# Patient Record
Sex: MALE | Race: WHITE | Employment: OTHER | URBAN - METROPOLITAN AREA
[De-identification: names, ages, dates, MRNs, and addresses within clinical notes are randomized per-mention and may not be internally consistent; named-entity substitution may affect disease eponyms.]

---

## 2017-02-02 RX ORDER — COLESEVELAM HYDROCHLORIDE 625 MG/1
TABLET, FILM COATED ORAL
Qty: 90 TABLET | Refills: 5 | Status: SHIPPED | OUTPATIENT
Start: 2017-02-02 | End: 2017-02-08 | Stop reason: SDUPTHER

## 2017-02-08 ENCOUNTER — OFFICE VISIT (OUTPATIENT)
Dept: FAMILY MEDICINE CLINIC | Age: 77
End: 2017-02-08

## 2017-02-08 VITALS
BODY MASS INDEX: 23.85 KG/M2 | OXYGEN SATURATION: 100 % | HEART RATE: 53 BPM | WEIGHT: 161 LBS | SYSTOLIC BLOOD PRESSURE: 116 MMHG | HEIGHT: 69 IN | DIASTOLIC BLOOD PRESSURE: 64 MMHG | TEMPERATURE: 97.5 F

## 2017-02-08 DIAGNOSIS — G25.81 RESTLESS LEGS SYNDROME: ICD-10-CM

## 2017-02-08 DIAGNOSIS — I25.10 ATHEROSCLEROSIS OF NATIVE CORONARY ARTERY OF NATIVE HEART WITHOUT ANGINA PECTORIS: Primary | ICD-10-CM

## 2017-02-08 DIAGNOSIS — E78.00 PURE HYPERCHOLESTEROLEMIA: ICD-10-CM

## 2017-02-08 DIAGNOSIS — E55.9 VITAMIN D DEFICIENCY DISEASE: ICD-10-CM

## 2017-02-08 DIAGNOSIS — F51.04 INSOMNIA, PSYCHOPHYSIOLOGICAL: ICD-10-CM

## 2017-02-08 LAB
CHOLESTEROL, TOTAL: 152 MG/DL (ref 0–199)
HDLC SERPL-MCNC: 58 MG/DL (ref 40–60)
LDL CHOLESTEROL CALCULATED: 81 MG/DL
TRIGL SERPL-MCNC: 64 MG/DL (ref 0–150)
VITAMIN D 25-HYDROXY: 34.7 NG/ML
VLDLC SERPL CALC-MCNC: 13 MG/DL

## 2017-02-08 PROCEDURE — G8427 DOCREV CUR MEDS BY ELIG CLIN: HCPCS | Performed by: FAMILY MEDICINE

## 2017-02-08 PROCEDURE — 1036F TOBACCO NON-USER: CPT | Performed by: FAMILY MEDICINE

## 2017-02-08 PROCEDURE — 4040F PNEUMOC VAC/ADMIN/RCVD: CPT | Performed by: FAMILY MEDICINE

## 2017-02-08 PROCEDURE — 36415 COLL VENOUS BLD VENIPUNCTURE: CPT | Performed by: FAMILY MEDICINE

## 2017-02-08 PROCEDURE — 3017F COLORECTAL CA SCREEN DOC REV: CPT | Performed by: FAMILY MEDICINE

## 2017-02-08 PROCEDURE — G8420 CALC BMI NORM PARAMETERS: HCPCS | Performed by: FAMILY MEDICINE

## 2017-02-08 PROCEDURE — G8484 FLU IMMUNIZE NO ADMIN: HCPCS | Performed by: FAMILY MEDICINE

## 2017-02-08 PROCEDURE — G8598 ASA/ANTIPLAT THER USED: HCPCS | Performed by: FAMILY MEDICINE

## 2017-02-08 PROCEDURE — 1123F ACP DISCUSS/DSCN MKR DOCD: CPT | Performed by: FAMILY MEDICINE

## 2017-02-08 PROCEDURE — 99214 OFFICE O/P EST MOD 30 MIN: CPT | Performed by: FAMILY MEDICINE

## 2017-02-08 RX ORDER — COLESEVELAM 180 1/1
TABLET ORAL
Qty: 90 TABLET | Refills: 5 | Status: SHIPPED | OUTPATIENT
Start: 2017-02-08 | End: 2017-06-14

## 2017-02-08 RX ORDER — ZOLPIDEM TARTRATE 10 MG/1
TABLET ORAL
Qty: 60 TABLET | Refills: 3 | Status: SHIPPED | OUTPATIENT
Start: 2017-02-08 | End: 2017-06-14 | Stop reason: SDUPTHER

## 2017-02-08 RX ORDER — NITROGLYCERIN 0.4 MG/1
0.4 TABLET SUBLINGUAL EVERY 5 MIN PRN
Qty: 25 TABLET | Refills: 0 | Status: ON HOLD | OUTPATIENT
Start: 2017-02-08 | End: 2019-08-03

## 2017-06-14 ENCOUNTER — OFFICE VISIT (OUTPATIENT)
Dept: FAMILY MEDICINE CLINIC | Age: 77
End: 2017-06-14

## 2017-06-14 VITALS
TEMPERATURE: 97.5 F | BODY MASS INDEX: 22.96 KG/M2 | DIASTOLIC BLOOD PRESSURE: 64 MMHG | SYSTOLIC BLOOD PRESSURE: 118 MMHG | HEART RATE: 52 BPM | WEIGHT: 155 LBS | OXYGEN SATURATION: 99 % | HEIGHT: 69 IN

## 2017-06-14 DIAGNOSIS — G25.81 RESTLESS LEGS SYNDROME: ICD-10-CM

## 2017-06-14 DIAGNOSIS — E78.00 PURE HYPERCHOLESTEROLEMIA: ICD-10-CM

## 2017-06-14 DIAGNOSIS — Z80.0 FH: COLON CANCER: ICD-10-CM

## 2017-06-14 DIAGNOSIS — I25.10 ATHEROSCLEROSIS OF NATIVE CORONARY ARTERY OF NATIVE HEART WITHOUT ANGINA PECTORIS: Primary | ICD-10-CM

## 2017-06-14 DIAGNOSIS — R10.30 LOWER ABDOMINAL PAIN: ICD-10-CM

## 2017-06-14 DIAGNOSIS — E83.41 HYPERMAGNESEMIA: ICD-10-CM

## 2017-06-14 DIAGNOSIS — F51.04 INSOMNIA, PSYCHOPHYSIOLOGICAL: ICD-10-CM

## 2017-06-14 DIAGNOSIS — Z87.442 HISTORY OF KIDNEY STONES: ICD-10-CM

## 2017-06-14 LAB
A/G RATIO: 1.8 (ref 1.1–2.2)
ALBUMIN SERPL-MCNC: 4.6 G/DL (ref 3.4–5)
ALP BLD-CCNC: 49 U/L (ref 40–129)
ALT SERPL-CCNC: 19 U/L (ref 10–40)
ANION GAP SERPL CALCULATED.3IONS-SCNC: 13 MMOL/L (ref 3–16)
AST SERPL-CCNC: 22 U/L (ref 15–37)
BASOPHILS ABSOLUTE: 0 K/UL (ref 0–0.2)
BASOPHILS RELATIVE PERCENT: 1.1 %
BILIRUB SERPL-MCNC: 0.8 MG/DL (ref 0–1)
BUN BLDV-MCNC: 15 MG/DL (ref 7–20)
CALCIUM SERPL-MCNC: 9.4 MG/DL (ref 8.3–10.6)
CHLORIDE BLD-SCNC: 103 MMOL/L (ref 99–110)
CHOLESTEROL, TOTAL: 164 MG/DL (ref 0–199)
CO2: 26 MMOL/L (ref 21–32)
CREAT SERPL-MCNC: 1 MG/DL (ref 0.8–1.3)
EOSINOPHILS ABSOLUTE: 0 K/UL (ref 0–0.6)
EOSINOPHILS RELATIVE PERCENT: 1 %
GFR AFRICAN AMERICAN: >60
GFR NON-AFRICAN AMERICAN: >60
GLOBULIN: 2.5 G/DL
GLUCOSE BLD-MCNC: 98 MG/DL (ref 70–99)
HCT VFR BLD CALC: 44.3 % (ref 40.5–52.5)
HDLC SERPL-MCNC: 59 MG/DL (ref 40–60)
HEMOGLOBIN: 14.7 G/DL (ref 13.5–17.5)
LDL CHOLESTEROL CALCULATED: 91 MG/DL
LYMPHOCYTES ABSOLUTE: 1.1 K/UL (ref 1–5.1)
LYMPHOCYTES RELATIVE PERCENT: 25.3 %
MAGNESIUM: 2.3 MG/DL (ref 1.8–2.4)
MCH RBC QN AUTO: 31.9 PG (ref 26–34)
MCHC RBC AUTO-ENTMCNC: 33.1 G/DL (ref 31–36)
MCV RBC AUTO: 96.3 FL (ref 80–100)
MONOCYTES ABSOLUTE: 0.5 K/UL (ref 0–1.3)
MONOCYTES RELATIVE PERCENT: 10.5 %
NEUTROPHILS ABSOLUTE: 2.7 K/UL (ref 1.7–7.7)
NEUTROPHILS RELATIVE PERCENT: 62.1 %
PDW BLD-RTO: 13.7 % (ref 12.4–15.4)
PLATELET # BLD: 207 K/UL (ref 135–450)
PMV BLD AUTO: 8.3 FL (ref 5–10.5)
POTASSIUM SERPL-SCNC: 4.1 MMOL/L (ref 3.5–5.1)
RBC # BLD: 4.6 M/UL (ref 4.2–5.9)
SODIUM BLD-SCNC: 142 MMOL/L (ref 136–145)
TOTAL PROTEIN: 7.1 G/DL (ref 6.4–8.2)
TRIGL SERPL-MCNC: 72 MG/DL (ref 0–150)
VLDLC SERPL CALC-MCNC: 14 MG/DL
WBC # BLD: 4.3 K/UL (ref 4–11)

## 2017-06-14 PROCEDURE — 99214 OFFICE O/P EST MOD 30 MIN: CPT | Performed by: FAMILY MEDICINE

## 2017-06-14 PROCEDURE — 36415 COLL VENOUS BLD VENIPUNCTURE: CPT | Performed by: FAMILY MEDICINE

## 2017-06-14 PROCEDURE — G8427 DOCREV CUR MEDS BY ELIG CLIN: HCPCS | Performed by: FAMILY MEDICINE

## 2017-06-14 PROCEDURE — 4040F PNEUMOC VAC/ADMIN/RCVD: CPT | Performed by: FAMILY MEDICINE

## 2017-06-14 PROCEDURE — 1123F ACP DISCUSS/DSCN MKR DOCD: CPT | Performed by: FAMILY MEDICINE

## 2017-06-14 PROCEDURE — G8598 ASA/ANTIPLAT THER USED: HCPCS | Performed by: FAMILY MEDICINE

## 2017-06-14 PROCEDURE — 1036F TOBACCO NON-USER: CPT | Performed by: FAMILY MEDICINE

## 2017-06-14 PROCEDURE — 3017F COLORECTAL CA SCREEN DOC REV: CPT | Performed by: FAMILY MEDICINE

## 2017-06-14 PROCEDURE — G8420 CALC BMI NORM PARAMETERS: HCPCS | Performed by: FAMILY MEDICINE

## 2017-06-14 RX ORDER — COLESEVELAM 180 1/1
625 TABLET ORAL 2 TIMES DAILY WITH MEALS
Qty: 1 TABLET | Refills: 0
Start: 2017-06-14 | End: 2017-10-17 | Stop reason: SDUPTHER

## 2017-06-14 RX ORDER — DIAZEPAM 10 MG/1
10 TABLET ORAL NIGHTLY PRN
Qty: 30 TABLET | Refills: 0 | Status: SHIPPED | OUTPATIENT
Start: 2017-06-14 | End: 2017-07-12 | Stop reason: SDUPTHER

## 2017-06-14 RX ORDER — ZOLPIDEM TARTRATE 10 MG/1
TABLET ORAL
Qty: 60 TABLET | Refills: 3 | Status: SHIPPED | OUTPATIENT
Start: 2017-06-14 | End: 2017-10-17 | Stop reason: SDUPTHER

## 2017-06-14 ASSESSMENT — PATIENT HEALTH QUESTIONNAIRE - PHQ9
SUM OF ALL RESPONSES TO PHQ QUESTIONS 1-9: 0
1. LITTLE INTEREST OR PLEASURE IN DOING THINGS: 0
2. FEELING DOWN, DEPRESSED OR HOPELESS: 0
SUM OF ALL RESPONSES TO PHQ9 QUESTIONS 1 & 2: 0

## 2017-07-13 RX ORDER — DIAZEPAM 10 MG/1
TABLET ORAL
Qty: 30 TABLET | Refills: 2 | OUTPATIENT
Start: 2017-07-13 | End: 2018-04-17 | Stop reason: SDUPTHER

## 2017-10-17 ENCOUNTER — OFFICE VISIT (OUTPATIENT)
Dept: FAMILY MEDICINE CLINIC | Age: 77
End: 2017-10-17

## 2017-10-17 VITALS
OXYGEN SATURATION: 99 % | WEIGHT: 160 LBS | TEMPERATURE: 97.5 F | DIASTOLIC BLOOD PRESSURE: 62 MMHG | SYSTOLIC BLOOD PRESSURE: 128 MMHG | BODY MASS INDEX: 23.63 KG/M2 | HEART RATE: 52 BPM

## 2017-10-17 DIAGNOSIS — I25.10 ATHEROSCLEROSIS OF NATIVE CORONARY ARTERY OF NATIVE HEART WITHOUT ANGINA PECTORIS: ICD-10-CM

## 2017-10-17 DIAGNOSIS — F51.04 INSOMNIA, PSYCHOPHYSIOLOGICAL: Primary | ICD-10-CM

## 2017-10-17 DIAGNOSIS — R10.84 GENERALIZED ABDOMINAL PAIN: ICD-10-CM

## 2017-10-17 DIAGNOSIS — Z87.442 HISTORY OF KIDNEY STONES: ICD-10-CM

## 2017-10-17 DIAGNOSIS — G25.81 RESTLESS LEGS SYNDROME: ICD-10-CM

## 2017-10-17 PROCEDURE — G8484 FLU IMMUNIZE NO ADMIN: HCPCS | Performed by: FAMILY MEDICINE

## 2017-10-17 PROCEDURE — 99214 OFFICE O/P EST MOD 30 MIN: CPT | Performed by: FAMILY MEDICINE

## 2017-10-17 PROCEDURE — 90688 IIV4 VACCINE SPLT 0.5 ML IM: CPT | Performed by: FAMILY MEDICINE

## 2017-10-17 PROCEDURE — 1036F TOBACCO NON-USER: CPT | Performed by: FAMILY MEDICINE

## 2017-10-17 PROCEDURE — 4040F PNEUMOC VAC/ADMIN/RCVD: CPT | Performed by: FAMILY MEDICINE

## 2017-10-17 PROCEDURE — 1123F ACP DISCUSS/DSCN MKR DOCD: CPT | Performed by: FAMILY MEDICINE

## 2017-10-17 PROCEDURE — G8420 CALC BMI NORM PARAMETERS: HCPCS | Performed by: FAMILY MEDICINE

## 2017-10-17 PROCEDURE — G8427 DOCREV CUR MEDS BY ELIG CLIN: HCPCS | Performed by: FAMILY MEDICINE

## 2017-10-17 PROCEDURE — G0008 ADMIN INFLUENZA VIRUS VAC: HCPCS | Performed by: FAMILY MEDICINE

## 2017-10-17 PROCEDURE — G8598 ASA/ANTIPLAT THER USED: HCPCS | Performed by: FAMILY MEDICINE

## 2017-10-17 PROCEDURE — 3017F COLORECTAL CA SCREEN DOC REV: CPT | Performed by: FAMILY MEDICINE

## 2017-10-17 RX ORDER — COLESEVELAM 180 1/1
1250 TABLET ORAL DAILY
Qty: 60 TABLET | Refills: 5 | Status: SHIPPED | OUTPATIENT
Start: 2017-10-17 | End: 2018-04-13 | Stop reason: SDUPTHER

## 2017-10-17 RX ORDER — ZOLPIDEM TARTRATE 10 MG/1
TABLET ORAL
Qty: 60 TABLET | Refills: 5 | Status: SHIPPED | OUTPATIENT
Start: 2017-10-17 | End: 2018-04-13 | Stop reason: SDUPTHER

## 2017-10-17 NOTE — PROGRESS NOTES
Vaccine Information Sheet, \"Influenza - Inactivated\"  given to Carlos Hopper., or parent/legal guardian of  Carlos Hopper. and verbalized understanding. Patient responses:    Have you ever had a reaction to a flu vaccine? No  Are you able to eat eggs without adverse effects? No  Do you have any current illness? No  Have you ever had Guillian [place] Syndrome? No    Flu vaccine given per order. Please see immunization tab.

## 2017-10-17 NOTE — PROGRESS NOTES
Assessment and plan  1. Insomnia, psychophysiological Stable     2. Atherosclerosis of native coronary artery of native heart without angina pectoris Stable     3. Restless legs syndrome Stable     4. Generalized abdominal pain Resolved, likely due to [place]    5. History of kidney stones  Can cautiously try Theralith one a day. All chronic problems evaluated today are stable or controlled unless noted above. Healthy Family prevention recommendations given. Continue all current prescription medications as listed below. OARRS report for [facility] and Casimiro Barrientos for [place] for the last 12 months was requested and reviewed today. The results of the report was consistent with the current treatment plan. RTC 6 months or sooner prn. Subjective  Patient returns for reevaluation of his medical problems including insomnia, restless legs, coronary artery disease, and his abdominal pain. His insomnia is about the same. He's had no angina or exercise intolerance. His restless leg symptoms have actually been better, and he hasn't had to fill diazepam since mid June. The abdominal pain he was having last visit resolved when he stopped the [place]. However he is concerned about recurrent kidney stones. Social History   Substance Use Topics    Smoking status: Former Smoker     Packs/day: 1.00     Years: 10.00     Types: Cigarettes     Start date: 1/1/1954     Quit date: 1/1/1963    Smokeless tobacco: Never Used      Comment: I haven't used tobacco in any form for over 50 years    Alcohol use 1.2 oz/week     1 Glasses of wine, 1 Cans of beer per week      Comment: I average 6 beers or glasses of wine per week. Past history:  The patient reports he has not had other tests, been to the ER, or visits with a specialist since his last visit with me.     Review of systems:  Constitutional:  fatigue - no                                                  abnormal weight loss - no  Neurologic:  dizziness - no                       syncope - no  Cardiac:  chest pain or discomfort - no                 orthopnea or PND - no  Respiratory: wheezing - no                       dyspnea on exertion - no            unusual cough - no    Objective  /62   Pulse 52   Temp 97.5 °F (36.4 °C) (Oral)   Wt 160 lb (72.6 kg)   SpO2 99%   BMI 23.63 kg/m²   Patient is alert, oriented, and in no acute distress  Psych:  mood and affect are unremarkable               speech and thought processes appear intact               Behavior and appearance unremarkable  Neck:  No masses, trachea midline, phonation normal   Thyroid - unremarkable              Cervical  adenopathy - nothing significant  Chest:  No deformity of thorax               Respirations easy and unlabored              Lungs - clear to auscultation     Breath sounds - equal  Heart:   Rhythm - regular              Murmur - none   Gallop - none               Pretibial pitting edema - none    José Luis Interiano MD    Outpatient Prescriptions Marked as Taking for the 10/17/17 encounter (Office Visit) with José Luis Interiano MD   Medication Sig Dispense Refill    colesevelam (WELCHOL) 625 MG tablet Take 2 tablets by mouth daily 60 tablet 5    zolpidem (AMBIEN) 10 MG tablet TAKE 2 TABLETS AT BEDTIME AS NEEDED FOR SLEEP 60 tablet 5    diazepam (VALIUM) 10 MG tablet TAKE 1 TABLET NIGHTLY AS NEEDED FOR SLEEP 30 tablet 2    nitroGLYCERIN (NITROSTAT) 0.4 MG SL tablet Place 1 tablet under the tongue every 5 minutes as needed for Chest pain 25 tablet 0    ECHINACEA PO Take by mouth      docusate sodium (COLACE) 100 MG capsule Take 100 mg by mouth daily      PSYLLIUM PO Take by mouth      COD LIVER OIL PO Take 2,000 mg by mouth      Ascorbic Acid (VITAMIN C) 500 MG tablet Take 500 mg by mouth daily      Cholecalciferol (VITAMIN D3) 1000 UNITS CAPS Take 4,000 Units by mouth daily (Patient taking differently: Take 2,000 Units by mouth daily ) 1 capsule 0    finasteride (PROSCAR) 5 MG

## 2017-10-31 PROBLEM — E78.2 MIXED HYPERLIPIDEMIA: Status: ACTIVE | Noted: 2017-10-31

## 2017-11-01 ENCOUNTER — OFFICE VISIT (OUTPATIENT)
Dept: CARDIOLOGY CLINIC | Age: 77
End: 2017-11-01

## 2017-11-01 VITALS
BODY MASS INDEX: 23.41 KG/M2 | WEIGHT: 158.08 LBS | HEART RATE: 50 BPM | DIASTOLIC BLOOD PRESSURE: 62 MMHG | SYSTOLIC BLOOD PRESSURE: 116 MMHG | OXYGEN SATURATION: 97 % | HEIGHT: 69 IN

## 2017-11-01 DIAGNOSIS — E78.00 PURE HYPERCHOLESTEROLEMIA: ICD-10-CM

## 2017-11-01 DIAGNOSIS — I25.10 ATHEROSCLEROSIS OF NATIVE CORONARY ARTERY OF NATIVE HEART WITHOUT ANGINA PECTORIS: Primary | ICD-10-CM

## 2017-11-01 PROBLEM — E78.2 MIXED HYPERLIPIDEMIA: Status: RESOLVED | Noted: 2017-10-31 | Resolved: 2017-11-01

## 2017-11-01 PROCEDURE — 1123F ACP DISCUSS/DSCN MKR DOCD: CPT | Performed by: INTERNAL MEDICINE

## 2017-11-01 PROCEDURE — G8420 CALC BMI NORM PARAMETERS: HCPCS | Performed by: INTERNAL MEDICINE

## 2017-11-01 PROCEDURE — 4040F PNEUMOC VAC/ADMIN/RCVD: CPT | Performed by: INTERNAL MEDICINE

## 2017-11-01 PROCEDURE — G8598 ASA/ANTIPLAT THER USED: HCPCS | Performed by: INTERNAL MEDICINE

## 2017-11-01 PROCEDURE — 99214 OFFICE O/P EST MOD 30 MIN: CPT | Performed by: INTERNAL MEDICINE

## 2017-11-01 PROCEDURE — G8427 DOCREV CUR MEDS BY ELIG CLIN: HCPCS | Performed by: INTERNAL MEDICINE

## 2017-11-01 PROCEDURE — 1036F TOBACCO NON-USER: CPT | Performed by: INTERNAL MEDICINE

## 2017-11-01 PROCEDURE — G8484 FLU IMMUNIZE NO ADMIN: HCPCS | Performed by: INTERNAL MEDICINE

## 2017-11-01 PROCEDURE — 3017F COLORECTAL CA SCREEN DOC REV: CPT | Performed by: INTERNAL MEDICINE

## 2017-11-01 NOTE — PROGRESS NOTES
stenosis . Bilateral vertebral arteries are patent with flow in the normal direction. EKG 11/26/14  Sinus bradycardia with marked sinus arrhythmiaNo previous ECGs availableConfirmed by AMY NICHOLSON MD ([id number]) on 11/26/2014   CATH 11/26/14  Findings:     LM: normals  LAD: previous proximal stent patent with 30-40% instent restenosis at distal stent edge. LCX: DOMINANT, luminals. Om1 with mid 20%, distal LCX/PDA with 10-20%  RCA: small nonodominant    LVEDP 7  LVEF 65%    Plan:  1. Patent LAD stent  2. Nonobstructive CAD    Assessment:  Ryne Zabala was seen today for coronary artery disease, hyperlipidemia and check-up. Diagnoses and associated orders for this visit:    CAD (coronary artery disease): sp stent to the LAD in 2010. Hyperlipidemia: intolerant to statin therapy. Managed on Welchol. Last lipids 2/12/16  Cholesterol, [id number]  TG 73 HDL55 LDL Calculated 92  Statin intolerance        Plan:  1. Doing well overall from cardiac standpoint  2. No changes warranted in his medication regimen at this time. 3.  He will follow up with me in 1 year. 4. Healthy lifestyle education reviewed including nutrition, exercise and activity    I reviewed all his labs in epic. QUALITY MEASURES  1. Tobacco Cessation Counseling: NA  2. Retake of BP if >140/90:   NA  3. Documentation to PCP/referring for new patient:  Sent to PCP at close of office visit  4. CAD patient on anti-platelet: Yes  5. CAD patient on STATIN therapy: Not unable to tolerate statin  6.  Patient with CHF and aFib on anticoagulation:  NA       [address] [place], MD 11/1/2017 3:46 PM

## 2017-11-01 NOTE — COMMUNICATION BODY
heart disease  at 82 CHF    Past Medical History:   Diagnosis Date    Chronic fatigue     Kidney stones     Dr Carlos Mariee, takes TheraLith XR supplement    Routine health maintenance     TAC 7/10 ; sees urologist routinely    Vitamin D deficiency disease      Past Surgical History:   Procedure Laterality Date    CORONARY ANGIOPLASTY WITH STENT PLACEMENT  2010    THYROIDECTOMY, PARTIAL  04       Objective:   /62   Pulse 50   Ht 5' 9\" (1.753 m)   Wt 158 lb 1.3 oz (71.7 kg)   SpO2 97%   BMI 23.34 kg/m²      Wt Readings from Last 3 Encounters:   17 158 lb 1.3 oz (71.7 kg)   10/17/17 160 lb (72.6 kg)   17 155 lb (70.3 kg)       Physical Exam:  General: No Respiratory distress, appears well developed and well nourished. Eyes:  Sclera nonicteric  Nose/Sinuses:  negative findings: nose shows no deformity, asymmetry, or inflammation, nasal mucosa normal, septum midline with no perforation or bleeding  Back:  no pain to palpation  Joint:  no active joint inflammation  Musculoskeletal:  negative  Skin:  Warm and dry  Neck:  Negative for JVD and Carotid Bruits. Chest:  Clear to auscultation, respiration easy  Cardiovascular: RRR 52 bpm S2 normal, no murmur, no rub or thrill.   Abdomen:  Soft normal liver and spleen  Extremities:   No edema, No clubbing, cyanosis,  Pulses: Femoral and pedal pulses are normal.  Neuro: intact    Medications:   Outpatient Encounter Prescriptions as of 2017   Medication Sig Dispense Refill    colesevelam (WELCHOL) 625 MG tablet Take 2 tablets by mouth daily 60 tablet 5    zolpidem (AMBIEN) 10 MG tablet TAKE 2 TABLETS AT BEDTIME AS NEEDED FOR SLEEP 60 tablet 5    diazepam (VALIUM) 10 MG tablet TAKE 1 TABLET NIGHTLY AS NEEDED FOR SLEEP 30 tablet 2    nitroGLYCERIN (NITROSTAT) 0.4 MG SL tablet Place 1 tablet under the tongue every 5 minutes as needed for Chest pain 25 tablet 0    ECHINACEA PO Take by mouth      docusate sodium (COLACE) 100 MG capsule Take stenosis . Bilateral vertebral arteries are patent with flow in the normal direction. EKG 11/26/14  Sinus bradycardia with marked sinus arrhythmiaNo previous ECGs availableConfirmed by AMY NICHOLSON MD ([id number]) on 11/26/2014   CATH 11/26/14  Findings:     LM: normals  LAD: previous proximal stent patent with 30-40% instent restenosis at distal stent edge. LCX: DOMINANT, luminals. Om1 with mid 20%, distal LCX/PDA with 10-20%  RCA: small nonodominant    LVEDP 7  LVEF 65%    Plan:  1. Patent LAD stent  2. Nonobstructive CAD    Assessment:  Jovon Silver was seen today for coronary artery disease, hyperlipidemia and check-up. Diagnoses and associated orders for this visit:    CAD (coronary artery disease): sp stent to the LAD in 2010. Hyperlipidemia: intolerant to statin therapy. Managed on Welchol. Last lipids 2/12/16  Cholesterol, [id number]  TG 73 HDL55 LDL Calculated 92  Statin intolerance        Plan:  1. Doing well overall from cardiac standpoint  2. No changes warranted in his medication regimen at this time. 3.  He will follow up with me in 1 year. 4. Healthy lifestyle education reviewed including nutrition, exercise and activity    I reviewed all his labs in epic. QUALITY MEASURES  1. Tobacco Cessation Counseling: NA  2. Retake of BP if >140/90:   NA  3. Documentation to PCP/referring for new patient:  Sent to PCP at close of office visit  4. CAD patient on anti-platelet: Yes  5. CAD patient on STATIN therapy: Not unable to tolerate statin  6.  Patient with CHF and aFib on anticoagulation:  TRENT Sarabia MD 11/1/2017 3:46 PM

## 2017-11-01 NOTE — LETTER
Neuro: intact    Medications:   Outpatient Encounter Prescriptions as of 11/1/2017   Medication Sig Dispense Refill    colesevelam (WELCHOL) 625 MG tablet Take 2 tablets by mouth daily 60 tablet 5    zolpidem (AMBIEN) 10 MG tablet TAKE 2 TABLETS AT BEDTIME AS NEEDED FOR SLEEP 60 tablet 5    diazepam (VALIUM) 10 MG tablet TAKE 1 TABLET NIGHTLY AS NEEDED FOR SLEEP 30 tablet 2    nitroGLYCERIN (NITROSTAT) 0.4 MG SL tablet Place 1 tablet under the tongue every 5 minutes as needed for Chest pain 25 tablet 0    ECHINACEA PO Take by mouth      docusate sodium (COLACE) 100 MG capsule Take 100 mg by mouth daily      PSYLLIUM PO Take by mouth      COD LIVER OIL PO Take 2,000 mg by mouth      Ascorbic Acid (VITAMIN C) 500 MG tablet Take 500 mg by mouth daily Takes 4 tabs daily      Cholecalciferol (VITAMIN D3) 1000 UNITS CAPS Take 4,000 Units by mouth daily (Patient taking differently: Take 2,000 Units by mouth daily ) 1 capsule 0    finasteride (PROSCAR) 5 MG tablet Take 5 mg by mouth every other day      tamsulosin (FLOMAX) 0.4 MG capsule Take 0.8 mg by mouth every other day       aspirin 81 MG tablet 1 tablet daily 30 tablet 6     No facility-administered encounter medications on file as of 11/1/2017. Lab Data:  CBC: No results for input(s): WBC, HGB, HCT, MCV, PLT in the last 72 hours. BMP: No results for input(s): NA, K, CL, CO2, PHOS, BUN, CREATININE in the last 72 hours. Invalid input(s): CA  LIVER PROFILE: No results for input(s): AST, ALT, LIPASE, BILIDIR, BILITOT, ALKPHOS in the last 72 hours. Invalid input(s):   AMYLASE,  ALB  LIPID:   No components found for: CHLPL  Lab Results   Component Value Date    TRIG 72 06/14/2017    TRIG 64 02/08/2017    TRIG 73 02/12/2016     Lab Results   Component Value Date    HDL 59 06/14/2017    HDL 58 02/08/2017    HDL 55 02/12/2016     Lab Results   Component Value Date    LDLCALC 91 06/14/2017    LDLCALC 81 02/08/2017    [facility] 92 02/12/2016 Lab Results   Component Value Date    LABVLDL 14 06/14/2017    LABVLDL 13 02/08/2017    LABVLDL 15 02/12/2016     PT/INR: No results for input(s): PROTIME, INR in the last 72 hours. A1C:   No results found for: LABA1C  BNP:  No results for input(s): BNP in the last 72 hours. Last lipids   6/10/15Cholesterol, Total 166 Triglycerides 92 HDL 59  LDL Calculated 89    8/5/14  Cholesterol, Total 161   Triglycerides 103   HDL 53    LDL Calculated 87    IMAGING:   Carotid US 10/18/16  The right internal carotid artery demonstrates 0-50% stenosis . The left internal carotid artery demonstrates 0-50% stenosis . Bilateral vertebral arteries are patent with flow in the normal direction. EKG 11/26/14  Sinus bradycardia with marked sinus arrhythmiaNo previous ECGs availableConfirmed by AMY NICHOLSON MD ([id number]) on 11/26/2014   CATH 11/26/14  Findings:     LM: normals  LAD: previous proximal stent patent with 30-40% instent restenosis at distal stent edge. LCX: DOMINANT, luminals. Om1 with mid 20%, distal LCX/PDA with 10-20%  RCA: small nonodominant    LVEDP 7  LVEF 65%    Plan:  1. Patent LAD stent  2. Nonobstructive CAD    Assessment:  Anitra Sosa was seen today for coronary artery disease, hyperlipidemia and check-up. Diagnoses and associated orders for this visit:    CAD (coronary artery disease): sp stent to the LAD in 2010. Hyperlipidemia: intolerant to statin therapy. Managed on Welchol. Last lipids 2/12/16  Cholesterol, [id number]  TG 73 HDL55 LDL Calculated 92  Statin intolerance        Plan:  1. Doing well overall from cardiac standpoint  2. No changes warranted in his medication regimen at this time. 3.  He will follow up with me in 1 year. 4. Healthy lifestyle education reviewed including nutrition, exercise and activity    I reviewed all his labs in epic. QUALITY MEASURES  1. Tobacco Cessation Counseling: NA  2.  Retake of BP if >140/90:   NA

## 2017-11-01 NOTE — PATIENT INSTRUCTIONS
Plan:  1. Doing well overall from cardiac standpoint  2. No changes warranted in his medication regimen at this time. 3.  He will follow up with me in 1 year.    4. Healthy lifestyle education reviewed including nutrition, exercise and activity

## 2018-02-16 ENCOUNTER — OFFICE VISIT (OUTPATIENT)
Dept: FAMILY MEDICINE CLINIC | Age: 78
End: 2018-02-16

## 2018-02-16 ENCOUNTER — HOSPITAL ENCOUNTER (OUTPATIENT)
Dept: OTHER | Age: 78
Discharge: HOME | End: 2018-02-16
Attending: FAMILY MEDICINE | Admitting: FAMILY MEDICINE

## 2018-02-16 VITALS
DIASTOLIC BLOOD PRESSURE: 68 MMHG | SYSTOLIC BLOOD PRESSURE: 116 MMHG | HEART RATE: 51 BPM | OXYGEN SATURATION: 98 % | BODY MASS INDEX: 23.63 KG/M2 | WEIGHT: 160 LBS | TEMPERATURE: 97.6 F

## 2018-02-16 DIAGNOSIS — R06.03 ACUTE RESPIRATORY DISTRESS: Primary | ICD-10-CM

## 2018-02-16 DIAGNOSIS — R06.03 ACUTE RESPIRATORY DISTRESS: ICD-10-CM

## 2018-02-16 PROCEDURE — G8598 ASA/ANTIPLAT THER USED: HCPCS | Performed by: FAMILY MEDICINE

## 2018-02-16 PROCEDURE — G8420 CALC BMI NORM PARAMETERS: HCPCS | Performed by: FAMILY MEDICINE

## 2018-02-16 PROCEDURE — 3017F COLORECTAL CA SCREEN DOC REV: CPT | Performed by: FAMILY MEDICINE

## 2018-02-16 PROCEDURE — 1036F TOBACCO NON-USER: CPT | Performed by: FAMILY MEDICINE

## 2018-02-16 PROCEDURE — G8484 FLU IMMUNIZE NO ADMIN: HCPCS | Performed by: FAMILY MEDICINE

## 2018-02-16 PROCEDURE — 99213 OFFICE O/P EST LOW 20 MIN: CPT | Performed by: FAMILY MEDICINE

## 2018-02-16 PROCEDURE — 1123F ACP DISCUSS/DSCN MKR DOCD: CPT | Performed by: FAMILY MEDICINE

## 2018-02-16 PROCEDURE — G8427 DOCREV CUR MEDS BY ELIG CLIN: HCPCS | Performed by: FAMILY MEDICINE

## 2018-02-16 PROCEDURE — 4040F PNEUMOC VAC/ADMIN/RCVD: CPT | Performed by: FAMILY MEDICINE

## 2018-02-16 RX ORDER — INHALER, ASSIST DEVICES
SPACER (EA) MISCELLANEOUS
Qty: 1 DEVICE | Refills: 1 | Status: SHIPPED | OUTPATIENT
Start: 2018-02-16

## 2018-02-16 RX ORDER — ALBUTEROL SULFATE 90 UG/1
1-2 AEROSOL, METERED RESPIRATORY (INHALATION) EVERY 4 HOURS PRN
Qty: 1 INHALER | Refills: 5 | Status: SHIPPED | OUTPATIENT
Start: 2018-02-16 | End: 2019-02-03

## 2018-02-16 NOTE — PATIENT INSTRUCTIONS
Please compare this printed medication list with your medications at home to be sure they are the same. If you have any medications that are different please contact us immediately at [phone]. Also review your allergies that we have listed, these may also include medications that you have not been able to tolerate, make sure everything listed is correct. If you have any allergies that are different please contact us immediately at [phone].

## 2018-02-16 NOTE — PROGRESS NOTES
Assessment and plan  1. Acute respiratory distress Paroxysmal -  Suspect bronchospasm versus aspiration  albuterol sulfate HFA (PROVENTIL HFA) 108 (90 Base) MCG/ACT inhaler    Spacer/Aero-Holding Chambers (POCKET SPACER) MIGUEL    XR CHEST STANDARD (2 VW)  Refer to pulmonology     Return to clinic or call prn if these symptoms worsen or fail to improve and resolve as discussed    Subjective  Patient presents after having 3 separate episodes of paroxysmal acute respiratory distress. He reports he had one in October, one a month or 2 later, and the third just this week. Each episode is lasting approximately 15 minutes. The first woke him from his sleep at night, the third occurred about 9 PM.  He has some chronic sinus issues but that's unchanged. 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 However during these episodes he is able to speak. He describes a tightness in his chest and an inability to move air. He has had a cough with that as well. He denies any sense of reflux. He normally has no swallowing issues. He does not report any swelling of his legs, is not at risk for pulmonary embolism. He denies any angina symptoms. Objective  /68   Pulse 51   Temp 97.6 °F (36.4 °C) (Oral)   Wt 160 lb (72.6 kg)   SpO2 98%   BMI 23.63 kg/m²   Patient is alert, oriented, and in no acute distress.   Psych: mood and affect unremarkable                speech and thought processes intact  Neck:  No masses, trachea midline, phonation normal, thyroid unremarkable              No significant cervical or supraclavicular adenopathy  Chest:  No deformity of thorax              Respirations easy and unlabored with equal breath sounds              Lungs clear  Heart:  Rhythm - regular               Murmurs - none              Gallop - none               JVD - none              Pretibial edema - none    Fransisco Berman MD    The note was completed using Dragon

## 2018-02-21 ENCOUNTER — OFFICE VISIT (OUTPATIENT)
Dept: PULMONOLOGY | Age: 78
End: 2018-02-21

## 2018-02-21 VITALS
HEART RATE: 54 BPM | SYSTOLIC BLOOD PRESSURE: 121 MMHG | HEIGHT: 69 IN | BODY MASS INDEX: 23.85 KG/M2 | DIASTOLIC BLOOD PRESSURE: 60 MMHG | WEIGHT: 161 LBS | TEMPERATURE: 97.4 F | OXYGEN SATURATION: 97 % | RESPIRATION RATE: 16 BRPM

## 2018-02-21 DIAGNOSIS — G47.00 INSOMNIA, UNSPECIFIED TYPE: ICD-10-CM

## 2018-02-21 DIAGNOSIS — J38.5 LARYNGOSPASM: ICD-10-CM

## 2018-02-21 DIAGNOSIS — R06.02 SOB (SHORTNESS OF BREATH): Primary | ICD-10-CM

## 2018-02-21 DIAGNOSIS — R09.82 POSTNASAL DRIP: ICD-10-CM

## 2018-02-21 DIAGNOSIS — R13.10 DYSPHAGIA, UNSPECIFIED TYPE: ICD-10-CM

## 2018-02-21 DIAGNOSIS — Z72.821 POOR SLEEP HYGIENE: ICD-10-CM

## 2018-02-21 PROCEDURE — 99204 OFFICE O/P NEW MOD 45 MIN: CPT | Performed by: INTERNAL MEDICINE

## 2018-02-21 PROCEDURE — 1036F TOBACCO NON-USER: CPT | Performed by: INTERNAL MEDICINE

## 2018-02-21 PROCEDURE — G8427 DOCREV CUR MEDS BY ELIG CLIN: HCPCS | Performed by: INTERNAL MEDICINE

## 2018-02-21 PROCEDURE — 1123F ACP DISCUSS/DSCN MKR DOCD: CPT | Performed by: INTERNAL MEDICINE

## 2018-02-21 PROCEDURE — G8598 ASA/ANTIPLAT THER USED: HCPCS | Performed by: INTERNAL MEDICINE

## 2018-02-21 PROCEDURE — 3017F COLORECTAL CA SCREEN DOC REV: CPT | Performed by: INTERNAL MEDICINE

## 2018-02-21 PROCEDURE — 4040F PNEUMOC VAC/ADMIN/RCVD: CPT | Performed by: INTERNAL MEDICINE

## 2018-02-21 PROCEDURE — G8484 FLU IMMUNIZE NO ADMIN: HCPCS | Performed by: INTERNAL MEDICINE

## 2018-02-21 PROCEDURE — G8420 CALC BMI NORM PARAMETERS: HCPCS | Performed by: INTERNAL MEDICINE

## 2018-02-21 RX ORDER — MONTELUKAST SODIUM 10 MG/1
10 TABLET ORAL DAILY
Qty: 30 TABLET | Refills: 3 | Status: SHIPPED | OUTPATIENT
Start: 2018-02-21 | End: 2018-04-17 | Stop reason: SDUPTHER

## 2018-02-21 NOTE — PATIENT INSTRUCTIONS
PFT TEST  You have been scheduled for a Pulmonary Function Test on 3-2-18 at 12:00pm  at [address]:  Nothing my mouth 1 hour prior to test (water only is OK). No smoking or inhalers 4 hours prior to test unless directed differently by the physician. Please PRE-REGISTER at [phone] option 2, option 2,prior to your test date. Also, please remember to arrive 30 minutes prior to testing unless otherwise instructed.

## 2018-02-21 NOTE — LETTER
[facility] Pulmonary Critical Care and Sleep  [address] [place] [address] Way [address]  Phone: [phone]  Fax: [phone]    Reynaldo Lopez MD        February 22, 2018       Patient: Parisa Dennison   MR Number: [id number]   YOB: 1940   Date of Visit: 2/21/2018       Dear Dr. Duane Littles:    Thank you for the request for consultation for Royce Carrel to me for the evaluation of fatigue and shortness of breath. Below are the relevant portions of my assessment and plan of care. If you have questions, please do not hesitate to call me. I look forward to following Mary Douglas along with you. Sincerely,        Alisson Walker MD    CC providers: Lalito Walsh, [address] [facility] [address] [place] [address]  VIA In [facility], [address]  Thyroid Disease  [facility] [address]  VIA Mail     Susan Whitmore MD  [address] [place] [address].   [facility]  VIA In [facility]

## 2018-02-21 NOTE — PROGRESS NOTES
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 As per his wife surgery was done approximately 15 years ago at [facility], [place] to her recollection. He has not noted any voice change after surgery, but he had lost his voice and the thyroid nodule was discovered incidentally due toevaluation for voice changes. The patient has subtle difficulty swallowing, has to be careful about food and liquids when he is eating. He has not had an episode of aspiration pneumonia. The patient had a cat for about 20 years, presently has no pets. [place]. The patient has a long-standing chronic fatigue, for more than 30 years. He feels it is related to [place]-Barr virus. The patient has had allergic rhinitis and postnasal drip. He prefers to use natural supplements, does not want to use an antihistamine, as he feels the natural products prevent deterioration into bronchitis, sore throat and infections. He denies prior pneumonia, asthma or eczema. He does have flatulence and belching, denies GE reflux. His appetite is good, he denies any weight gain or weight loss. He denies GI or  complaints except for low stream due to prostatic enlargement. The patient has had significant sleep issues, was evaluated at the sleep clinic in [facility]. He has difficulty falling asleep. He goes into bed around 11 PM, does watch TV before falling asleep, but does not watch television in bed. He reads regularly between 8 and 10 PM.  He takes an Ambien 10 mg 15-20 minutes before going to bed, sleeps from 1-3 hours, and then awakens for unknown reason. He often takes a second Ambien at that point. If Ambien does not work, he uses diazepam 10 mg. He spends about 10 hours in bed, and thinks he sleeps about 6-3/4 of an hour. He wakes up about twice a night to urinate. He is fairly refreshed in the mornings.   The patient exercises, but not in the late evening. He either nabs or falls asleep after lunch between 12:30 and 1:30 PM, and does this to control his chronic fatigue. His wife denies snoring or apneas. Reportedly he did have a sleep study. The patient has worked as a , , and recently has been retired and has authored several books. He smoked 1 PPD for about 15-25 years, in the distant past.. He drinks about one beer a day. CXR 2/16/18  Reported normal, questionable early emphysema. Labs  On 6/14/17, CBC was normal, renal profile, lipids, hepatic profile were normal.  CBC was normal.    Nuclear stress test 11/17/16  Jaden Alejandro is no evidence of stress induced ischemia. The inferior wall is    diminished in the inferior wall on both stress and rest images c/w possible    diaphragm attenuation artifact. Normal LV function with ejection fraction of    63%. There are no regional wall motion abnormalities. Note abnormal EKG    portion but no perfusion abnormality. Low risk study.       Stress Protocols         I have personally reviewed the patient's past medical, surgical, family and personal history, made changes in EMR as necessary.       Past Medical History:   Diagnosis Date    Chronic fatigue     Kidney stones     Dr Britney Burger, takes TheraLith XR supplement    Routine health maintenance     TAC 7/10 ; sees urologist routinely    Vitamin D deficiency disease        Past Surgical History:   Procedure Laterality Date    CORONARY ANGIOPLASTY WITH STENT PLACEMENT  2/23/2010    THYROIDECTOMY, PARTIAL  04       Social History   Substance Use Topics    Smoking status: Former Smoker     Packs/day: 1.00     Years: 10.00     Types: Cigarettes     Start date: 1/1/1954     Quit date: 1/1/1963    Smokeless tobacco: Never Used      Comment: I haven't used tobacco in any form for over 50 years    Alcohol use 1.2 oz/week     1 Glasses of wine, 1 Cans of beer per week      Comment: I average 6 beers or glasses of wine per week. Family History   Problem Relation Age of Onset    Heart Disease Mother     Prostate Cancer Father [de-identified]         Current Outpatient Prescriptions:     montelukast (SINGULAIR) 10 MG tablet, Take 1 tablet by mouth daily, Disp: 30 tablet, Rfl: 3    albuterol sulfate HFA (PROVENTIL HFA) 108 (90 Base) MCG/ACT inhaler, Inhale 1-2 puffs into the lungs every 4 hours as needed for Wheezing or Shortness of Breath May substitute ProAir or Ventolin, Disp: 1 Inhaler, Rfl: 5    Spacer/Aero-Holding Chambers (POCKET SPACER) MIGUEL, Use with inhaler, Disp: 1 Device, Rfl: 1    colesevelam (WELCHOL) 625 MG tablet, Take 2 tablets by mouth daily, Disp: 60 tablet, Rfl: 5    zolpidem (AMBIEN) 10 MG tablet, TAKE 2 TABLETS AT BEDTIME AS NEEDED FOR SLEEP, Disp: 60 tablet, Rfl: 5    diazepam (VALIUM) 10 MG tablet, TAKE 1 TABLET NIGHTLY AS NEEDED FOR SLEEP, Disp: 30 tablet, Rfl: 2    nitroGLYCERIN (NITROSTAT) 0.4 MG SL tablet, Place 1 tablet under the tongue every 5 minutes as needed for Chest pain, Disp: 25 tablet, Rfl: 0    ECHINACEA PO, Take by mouth, Disp: , Rfl:     docusate sodium (COLACE) 100 MG capsule, Take 100 mg by mouth daily, Disp: , Rfl:     COD LIVER OIL PO, Take 2,000 mg by mouth, Disp: , Rfl:     Ascorbic Acid (VITAMIN C) 500 MG tablet, Take 500 mg by mouth daily Takes 4 tabs daily, Disp: , Rfl:     Cholecalciferol (VITAMIN D3) 1000 UNITS CAPS, Take 4,000 Units by mouth daily (Patient taking differently: Take 2,000 Units by mouth daily ), Disp: 1 capsule, Rfl: 0    tamsulosin (FLOMAX) 0.4 MG capsule, Take 0.8 mg by mouth every other day , Disp: , Rfl:     aspirin 81 MG tablet, 1 tablet daily, Disp: 30 tablet, Rfl: 6    Anectine [succinylcholine];  Lovastatin; Pravastatin; Simvastatin; and Tylenol [acetaminophen]    Vitals:    02/21/18 1312   BP: 121/60   Pulse: 54   Resp: 16   Temp: 97.4 °F (36.3 °C)   TempSrc: Oral   SpO2: 97%   Weight: 161 lb (73 kg)   Height: 5' 9\" (1.753 m)       Review of

## 2018-02-22 ASSESSMENT — ENCOUNTER SYMPTOMS
STRIDOR: 1
SHORTNESS OF BREATH: 1
CHOKING: 1
SORE THROAT: 1
COUGH: 1

## 2018-03-02 ENCOUNTER — HOSPITAL ENCOUNTER (OUTPATIENT)
Dept: PULMONOLOGY | Age: 78
Discharge: HOME | End: 2018-03-02
Attending: INTERNAL MEDICINE | Admitting: INTERNAL MEDICINE

## 2018-03-02 VITALS — OXYGEN SATURATION: 97 %

## 2018-03-02 DIAGNOSIS — I25.10 ATHEROSCLEROTIC HEART DISEASE OF NATIVE CORONARY ARTERY WITHOUT ANGINA PECTORIS: ICD-10-CM

## 2018-03-02 RX ORDER — ALBUTEROL SULFATE 2.5 MG/3ML
2.5 SOLUTION RESPIRATORY (INHALATION) ONCE
Status: COMPLETED | OUTPATIENT
Start: 2018-03-02 | End: 2018-03-02

## 2018-03-02 RX ADMIN — ALBUTEROL SULFATE 2.5 MG: 2.5 SOLUTION RESPIRATORY (INHALATION) at 11:43

## 2018-03-03 NOTE — PROCEDURES
[address]                  [address] [address]                                PULMONARY FUNCTION    PATIENT NAME: Derek Nelson                    :        1940  MED REC NO:   [id number]                          ROOM:  ACCOUNT NO:   [de-identified]                          ADMIT DATE: 2018  PROVIDER:     Jess Garcia MD    DATE OF PROCEDURE:  2018    ATTENDING PHYSICIAN:  Nataly Mendenhall MD    INDICATION:  Shortness of breath. FINDINGS:  1. Spirometry: The FVC is 4.1 L, which is 101% of predicted. The FEV1 is  3.01 L, which is 102% of predicted. The FEV1/FVC ratio is 0.73. There was  no significant response to  bronchodilators. 2.  Plethysmography:  The total lung capacity is 6.64 L, which is 98% of  predicted. 3.  The diffusion capacity of carbon monoxide is 23.56 mL/min/mmHg, which  is 86% of predicted. 4.  The flow volume loop is within normal limits. IMPRESSION:  There is no obstructive defect present. There is no  restrictive defect and the lung volumes are normal.  The DLCO is normal.   This test does not explain the patient's symptoms and clinical correlation  is recommended.         Matheus Roberts MD    D: 2018 15:49:41       T: 2018 22:50:46     [email or web address]  Job#: [id number]     Doc#: [id number]    CC:

## 2018-04-14 RX ORDER — COLESEVELAM HYDROCHLORIDE 625 MG/1
TABLET, FILM COATED ORAL
Qty: 90 TABLET | Refills: 2 | Status: SHIPPED | OUTPATIENT
Start: 2018-04-14 | End: 2018-09-14 | Stop reason: SDUPTHER

## 2018-04-16 RX ORDER — ZOLPIDEM TARTRATE 10 MG/1
20 TABLET ORAL NIGHTLY PRN
Qty: 60 TABLET | Refills: 2 | OUTPATIENT
Start: 2018-04-16 | End: 2018-04-18 | Stop reason: SDUPTHER

## 2018-04-17 ENCOUNTER — OFFICE VISIT (OUTPATIENT)
Dept: FAMILY MEDICINE CLINIC | Age: 78
End: 2018-04-17

## 2018-04-17 VITALS
SYSTOLIC BLOOD PRESSURE: 112 MMHG | TEMPERATURE: 97.5 F | HEART RATE: 55 BPM | OXYGEN SATURATION: 95 % | WEIGHT: 159.2 LBS | BODY MASS INDEX: 23.51 KG/M2 | DIASTOLIC BLOOD PRESSURE: 62 MMHG

## 2018-04-17 DIAGNOSIS — I25.10 ATHEROSCLEROSIS OF NATIVE CORONARY ARTERY OF NATIVE HEART WITHOUT ANGINA PECTORIS: Primary | ICD-10-CM

## 2018-04-17 DIAGNOSIS — J30.9 ALLERGIC SINUSITIS: ICD-10-CM

## 2018-04-17 DIAGNOSIS — G25.81 RESTLESS LEGS SYNDROME: ICD-10-CM

## 2018-04-17 DIAGNOSIS — F51.04 INSOMNIA, PSYCHOPHYSIOLOGICAL: ICD-10-CM

## 2018-04-17 DIAGNOSIS — E78.00 PURE HYPERCHOLESTEROLEMIA: ICD-10-CM

## 2018-04-17 DIAGNOSIS — Z12.5 PROSTATE CANCER SCREENING: ICD-10-CM

## 2018-04-17 DIAGNOSIS — E55.9 VITAMIN D DEFICIENCY: ICD-10-CM

## 2018-04-17 DIAGNOSIS — E55.9 VITAMIN D DEFICIENCY DISEASE: ICD-10-CM

## 2018-04-17 LAB
A/G RATIO: 2.1 (ref 1.1–2.2)
ALBUMIN SERPL-MCNC: 4.4 G/DL (ref 3.4–5)
ALP BLD-CCNC: 51 U/L (ref 40–129)
ALT SERPL-CCNC: 20 U/L (ref 10–40)
ANION GAP SERPL CALCULATED.3IONS-SCNC: 14 MMOL/L (ref 3–16)
AST SERPL-CCNC: 20 U/L (ref 15–37)
BASOPHILS ABSOLUTE: 0 K/UL (ref 0–0.2)
BASOPHILS RELATIVE PERCENT: 0.4 %
BILIRUB SERPL-MCNC: 0.8 MG/DL (ref 0–1)
BUN BLDV-MCNC: 14 MG/DL (ref 7–20)
CALCIUM SERPL-MCNC: 8.8 MG/DL (ref 8.3–10.6)
CHLORIDE BLD-SCNC: 102 MMOL/L (ref 99–110)
CHOLESTEROL, TOTAL: 164 MG/DL (ref 0–199)
CO2: 26 MMOL/L (ref 21–32)
CREAT SERPL-MCNC: 0.9 MG/DL (ref 0.8–1.3)
EOSINOPHILS ABSOLUTE: 0.1 K/UL (ref 0–0.6)
EOSINOPHILS RELATIVE PERCENT: 1.6 %
GFR AFRICAN AMERICAN: >60
GFR NON-AFRICAN AMERICAN: >60
GLOBULIN: 2.1 G/DL
GLUCOSE BLD-MCNC: 99 MG/DL (ref 70–99)
HCT VFR BLD CALC: 43.1 % (ref 40.5–52.5)
HDLC SERPL-MCNC: 56 MG/DL (ref 40–60)
HEMOGLOBIN: 14.7 G/DL (ref 13.5–17.5)
LDL CHOLESTEROL CALCULATED: 94 MG/DL
LYMPHOCYTES ABSOLUTE: 1 K/UL (ref 1–5.1)
LYMPHOCYTES RELATIVE PERCENT: 24.6 %
MAGNESIUM: 2.2 MG/DL (ref 1.8–2.4)
MCH RBC QN AUTO: 32.6 PG (ref 26–34)
MCHC RBC AUTO-ENTMCNC: 34.1 G/DL (ref 31–36)
MCV RBC AUTO: 95.6 FL (ref 80–100)
MONOCYTES ABSOLUTE: 0.5 K/UL (ref 0–1.3)
MONOCYTES RELATIVE PERCENT: 11 %
NEUTROPHILS ABSOLUTE: 2.6 K/UL (ref 1.7–7.7)
NEUTROPHILS RELATIVE PERCENT: 62.4 %
PDW BLD-RTO: 13 % (ref 12.4–15.4)
PLATELET # BLD: 185 K/UL (ref 135–450)
PMV BLD AUTO: 8.1 FL (ref 5–10.5)
POTASSIUM SERPL-SCNC: 4 MMOL/L (ref 3.5–5.1)
PROSTATE SPECIFIC ANTIGEN: 1.46 NG/ML (ref 0–4)
RBC # BLD: 4.51 M/UL (ref 4.2–5.9)
SODIUM BLD-SCNC: 142 MMOL/L (ref 136–145)
TOTAL PROTEIN: 6.5 G/DL (ref 6.4–8.2)
TRIGL SERPL-MCNC: 72 MG/DL (ref 0–150)
VITAMIN D 25-HYDROXY: 41.4 NG/ML
VLDLC SERPL CALC-MCNC: 14 MG/DL
WBC # BLD: 4.2 K/UL (ref 4–11)

## 2018-04-17 PROCEDURE — G8420 CALC BMI NORM PARAMETERS: HCPCS | Performed by: FAMILY MEDICINE

## 2018-04-17 PROCEDURE — 36415 COLL VENOUS BLD VENIPUNCTURE: CPT | Performed by: FAMILY MEDICINE

## 2018-04-17 PROCEDURE — 99214 OFFICE O/P EST MOD 30 MIN: CPT | Performed by: FAMILY MEDICINE

## 2018-04-17 PROCEDURE — 1036F TOBACCO NON-USER: CPT | Performed by: FAMILY MEDICINE

## 2018-04-17 PROCEDURE — 4040F PNEUMOC VAC/ADMIN/RCVD: CPT | Performed by: FAMILY MEDICINE

## 2018-04-17 PROCEDURE — G8427 DOCREV CUR MEDS BY ELIG CLIN: HCPCS | Performed by: FAMILY MEDICINE

## 2018-04-17 PROCEDURE — G8598 ASA/ANTIPLAT THER USED: HCPCS | Performed by: FAMILY MEDICINE

## 2018-04-17 PROCEDURE — 1123F ACP DISCUSS/DSCN MKR DOCD: CPT | Performed by: FAMILY MEDICINE

## 2018-04-17 RX ORDER — DIAZEPAM 10 MG/1
TABLET ORAL
Qty: 30 TABLET | Refills: 2 | Status: SHIPPED | OUTPATIENT
Start: 2018-04-17 | End: 2018-09-12 | Stop reason: SDUPTHER

## 2018-04-17 RX ORDER — MONTELUKAST SODIUM 10 MG/1
10 TABLET ORAL DAILY
Qty: 30 TABLET | Refills: 5 | Status: SHIPPED | OUTPATIENT
Start: 2018-04-17 | End: 2018-10-02 | Stop reason: SDUPTHER

## 2018-04-18 RX ORDER — ZOLPIDEM TARTRATE 10 MG/1
20 TABLET ORAL NIGHTLY PRN
Qty: 60 TABLET | Refills: 5 | OUTPATIENT
Start: 2018-04-18 | End: 2018-10-02 | Stop reason: SDUPTHER

## 2018-08-01 ENCOUNTER — TELEPHONE (OUTPATIENT)
Dept: CARDIOLOGY CLINIC | Age: 78
End: 2018-08-01

## 2018-09-12 DIAGNOSIS — G25.81 RESTLESS LEG SYNDROME: Primary | ICD-10-CM

## 2018-09-12 RX ORDER — DIAZEPAM 10 MG/1
TABLET ORAL
Qty: 30 TABLET | Refills: 0 | Status: SHIPPED | OUTPATIENT
Start: 2018-09-12 | End: 2018-09-12 | Stop reason: SDUPTHER

## 2018-09-12 RX ORDER — DIAZEPAM 10 MG/1
TABLET ORAL
Qty: 30 TABLET | Refills: 0 | OUTPATIENT
Start: 2018-09-12 | End: 2018-10-02 | Stop reason: SDUPTHER

## 2018-09-14 RX ORDER — COLESEVELAM HYDROCHLORIDE 625 MG/1
TABLET, FILM COATED ORAL
Qty: 90 TABLET | Refills: 5 | Status: SHIPPED | OUTPATIENT
Start: 2018-09-14 | End: 2018-11-06 | Stop reason: SINTOL

## 2018-10-02 ENCOUNTER — OFFICE VISIT (OUTPATIENT)
Dept: FAMILY MEDICINE CLINIC | Age: 78
End: 2018-10-02
Payer: MEDICARE

## 2018-10-02 VITALS
TEMPERATURE: 97.9 F | DIASTOLIC BLOOD PRESSURE: 62 MMHG | OXYGEN SATURATION: 98 % | HEART RATE: 52 BPM | SYSTOLIC BLOOD PRESSURE: 118 MMHG | BODY MASS INDEX: 22.83 KG/M2 | WEIGHT: 154.6 LBS

## 2018-10-02 DIAGNOSIS — M54.6 CHRONIC MIDLINE THORACIC BACK PAIN: ICD-10-CM

## 2018-10-02 DIAGNOSIS — J30.9 ALLERGIC SINUSITIS: ICD-10-CM

## 2018-10-02 DIAGNOSIS — M89.9 DISORDER OF BONE: ICD-10-CM

## 2018-10-02 DIAGNOSIS — E78.00 PURE HYPERCHOLESTEROLEMIA: ICD-10-CM

## 2018-10-02 DIAGNOSIS — F51.04 INSOMNIA, PSYCHOPHYSIOLOGICAL: ICD-10-CM

## 2018-10-02 DIAGNOSIS — I25.10 ATHEROSCLEROSIS OF NATIVE CORONARY ARTERY OF NATIVE HEART WITHOUT ANGINA PECTORIS: Primary | ICD-10-CM

## 2018-10-02 DIAGNOSIS — G89.29 CHRONIC MIDLINE THORACIC BACK PAIN: ICD-10-CM

## 2018-10-02 DIAGNOSIS — G25.81 RESTLESS LEG SYNDROME: ICD-10-CM

## 2018-10-02 LAB
A/G RATIO: 2.5 (ref 1.1–2.2)
ALBUMIN SERPL-MCNC: 4.7 G/DL (ref 3.4–5)
ALP BLD-CCNC: 55 U/L (ref 40–129)
ALT SERPL-CCNC: 15 U/L (ref 10–40)
ANION GAP SERPL CALCULATED.3IONS-SCNC: 13 MMOL/L (ref 3–16)
AST SERPL-CCNC: 19 U/L (ref 15–37)
BASOPHILS ABSOLUTE: 0 K/UL (ref 0–0.2)
BASOPHILS RELATIVE PERCENT: 0.6 %
BILIRUB SERPL-MCNC: 0.7 MG/DL (ref 0–1)
BUN BLDV-MCNC: 14 MG/DL (ref 7–20)
CALCIUM SERPL-MCNC: 9.3 MG/DL (ref 8.3–10.6)
CHLORIDE BLD-SCNC: 105 MMOL/L (ref 99–110)
CHOLESTEROL, TOTAL: 164 MG/DL (ref 0–199)
CO2: 25 MMOL/L (ref 21–32)
CREAT SERPL-MCNC: 1.1 MG/DL (ref 0.8–1.3)
EOSINOPHILS ABSOLUTE: 0 K/UL (ref 0–0.6)
EOSINOPHILS RELATIVE PERCENT: 0.8 %
GFR AFRICAN AMERICAN: >60
GFR NON-AFRICAN AMERICAN: >60
GLOBULIN: 1.9 G/DL
GLUCOSE BLD-MCNC: 97 MG/DL (ref 70–99)
HCT VFR BLD CALC: 42.4 % (ref 40.5–52.5)
HDLC SERPL-MCNC: 52 MG/DL (ref 40–60)
HEMOGLOBIN: 14.6 G/DL (ref 13.5–17.5)
LDL CHOLESTEROL CALCULATED: 97 MG/DL
LYMPHOCYTES ABSOLUTE: 1 K/UL (ref 1–5.1)
LYMPHOCYTES RELATIVE PERCENT: 21.9 %
MCH RBC QN AUTO: 33 PG (ref 26–34)
MCHC RBC AUTO-ENTMCNC: 34.5 G/DL (ref 31–36)
MCV RBC AUTO: 95.8 FL (ref 80–100)
MONOCYTES ABSOLUTE: 0.4 K/UL (ref 0–1.3)
MONOCYTES RELATIVE PERCENT: 9.5 %
NEUTROPHILS ABSOLUTE: 3.1 K/UL (ref 1.7–7.7)
NEUTROPHILS RELATIVE PERCENT: 67.2 %
PDW BLD-RTO: 13 % (ref 12.4–15.4)
PLATELET # BLD: 197 K/UL (ref 135–450)
PMV BLD AUTO: 8.5 FL (ref 5–10.5)
POTASSIUM SERPL-SCNC: 4.3 MMOL/L (ref 3.5–5.1)
RBC # BLD: 4.42 M/UL (ref 4.2–5.9)
SODIUM BLD-SCNC: 143 MMOL/L (ref 136–145)
TOTAL PROTEIN: 6.6 G/DL (ref 6.4–8.2)
TRIGL SERPL-MCNC: 77 MG/DL (ref 0–150)
VLDLC SERPL CALC-MCNC: 15 MG/DL
WBC # BLD: 4.6 K/UL (ref 4–11)

## 2018-10-02 PROCEDURE — G0008 ADMIN INFLUENZA VIRUS VAC: HCPCS | Performed by: FAMILY MEDICINE

## 2018-10-02 PROCEDURE — 90662 IIV NO PRSV INCREASED AG IM: CPT | Performed by: FAMILY MEDICINE

## 2018-10-02 PROCEDURE — 1101F PT FALLS ASSESS-DOCD LE1/YR: CPT | Performed by: FAMILY MEDICINE

## 2018-10-02 PROCEDURE — 1123F ACP DISCUSS/DSCN MKR DOCD: CPT | Performed by: FAMILY MEDICINE

## 2018-10-02 PROCEDURE — G8482 FLU IMMUNIZE ORDER/ADMIN: HCPCS | Performed by: FAMILY MEDICINE

## 2018-10-02 PROCEDURE — G8598 ASA/ANTIPLAT THER USED: HCPCS | Performed by: FAMILY MEDICINE

## 2018-10-02 PROCEDURE — G8510 SCR DEP NEG, NO PLAN REQD: HCPCS | Performed by: FAMILY MEDICINE

## 2018-10-02 PROCEDURE — 36415 COLL VENOUS BLD VENIPUNCTURE: CPT | Performed by: FAMILY MEDICINE

## 2018-10-02 PROCEDURE — 4040F PNEUMOC VAC/ADMIN/RCVD: CPT | Performed by: FAMILY MEDICINE

## 2018-10-02 PROCEDURE — G8427 DOCREV CUR MEDS BY ELIG CLIN: HCPCS | Performed by: FAMILY MEDICINE

## 2018-10-02 PROCEDURE — 99214 OFFICE O/P EST MOD 30 MIN: CPT | Performed by: FAMILY MEDICINE

## 2018-10-02 PROCEDURE — 1036F TOBACCO NON-USER: CPT | Performed by: FAMILY MEDICINE

## 2018-10-02 PROCEDURE — G8420 CALC BMI NORM PARAMETERS: HCPCS | Performed by: FAMILY MEDICINE

## 2018-10-02 RX ORDER — MONTELUKAST SODIUM 10 MG/1
10 TABLET ORAL DAILY
Qty: 30 TABLET | Refills: 5 | Status: SHIPPED | OUTPATIENT
Start: 2018-10-02 | End: 2019-03-11 | Stop reason: SDUPTHER

## 2018-10-02 RX ORDER — ZOLPIDEM TARTRATE 10 MG/1
20 TABLET ORAL NIGHTLY PRN
Qty: 60 TABLET | Refills: 2 | Status: SHIPPED | OUTPATIENT
Start: 2018-10-02 | End: 2019-01-11 | Stop reason: SDUPTHER

## 2018-10-02 RX ORDER — DIAZEPAM 10 MG/1
TABLET ORAL
Qty: 30 TABLET | Refills: 2 | Status: SHIPPED | OUTPATIENT
Start: 2018-10-02 | End: 2019-02-13 | Stop reason: SDUPTHER

## 2018-10-02 ASSESSMENT — PATIENT HEALTH QUESTIONNAIRE - PHQ9
1. LITTLE INTEREST OR PLEASURE IN DOING THINGS: 0
SUM OF ALL RESPONSES TO PHQ9 QUESTIONS 1 & 2: 0
SUM OF ALL RESPONSES TO PHQ QUESTIONS 1-9: 0
2. FEELING DOWN, DEPRESSED OR HOPELESS: 0
SUM OF ALL RESPONSES TO PHQ QUESTIONS 1-9: 0

## 2018-10-02 NOTE — PROGRESS NOTES
Vaccine Information Sheet, \"Influenza - Inactivated\"  given to Dimitry Galvan., or parent/legal guardian of  Dimitry Galvan. and verbalized understanding. Patient responses:    Have you ever had a reaction to a flu vaccine? No  Are you able to eat eggs without adverse effects? Yes  Do you have any current illness? No  Have you ever had Gu[place] Syndrome? No    Flu vaccine given per order. Please see immunization tab.
tobacco.  Allergies   Allergen Reactions    Anectine [Succinylcholine]     Lovastatin Other (See Comments)     Fatigue, eye strain,constipation    Pravastatin Other (See Comments)    Simvastatin     Tylenol [Acetaminophen]      Past history:  He has recently been to the dermatologist.    Review of systems:  Constitutional:  fatigue - no                                                  abnormal weight loss - no  Cardiac:  chest pain or discomfort - no                 lightheadedness - no  Respiratory: wheezing - no                       dyspnea on exertion - no            unusual cough - no  Gastrointestinal:  frequent heartburn- no                             dysphagia - no  Urologic:  Hematuria - no                   Dysuria - no    Objective  /62   Pulse 52   Temp 97.9 °F (36.6 °C) (Oral)   Wt 154 lb 9.6 oz (70.1 kg)   SpO2 98%   BMI 22.83 kg/m²   Patient is alert, oriented, and in no acute distress  Psych:  mood and affect are unremarkable               speech and thought processes appear intact               Behavior and appearance unremarkable  Neck:  No masses, trachea midline, phonation normal   Thyroid - unremarkable              Cervical  adenopathy - nothing significant  Chest: He does still appear to be getting a thoracic comp. There is no tenderness to palpation of the spinous processes or facet joints. Respirations easy and unlabored              Lungs - clear to auscultation     Breath sounds - equal  Heart:  Regular rhythm with no murmur, rub or gallop. No JVD. Pretibial pitting edema - none. Gregoria Gonzalez MD    Current Outpatient Prescriptions   Medication Sig Dispense Refill    montelukast (SINGULAIR) 10 MG tablet Take 1 tablet by mouth daily 30 tablet 5    diazepam (VALIUM) 10 MG tablet TAKE 1 TABLET NIGHTLY AS NEEDED. 30 tablet 2    zolpidem (AMBIEN) 10 MG tablet Take 2 tablets by mouth nightly as needed for Sleep for up to 90 days.  TAKE 2 TABLETS AT BEDTIME AS

## 2018-10-08 ENCOUNTER — HOSPITAL ENCOUNTER (OUTPATIENT)
Dept: GENERAL RADIOLOGY | Age: 78
Discharge: HOME | End: 2018-10-08
Payer: MEDICARE

## 2018-10-08 DIAGNOSIS — M89.9 DISORDER OF BONE: ICD-10-CM

## 2018-10-08 DIAGNOSIS — G89.29 CHRONIC MIDLINE THORACIC BACK PAIN: ICD-10-CM

## 2018-10-08 DIAGNOSIS — M54.6 CHRONIC MIDLINE THORACIC BACK PAIN: ICD-10-CM

## 2018-10-08 PROCEDURE — 72070 X-RAY EXAM THORAC SPINE 2VWS: CPT

## 2018-10-08 PROCEDURE — 77080 DXA BONE DENSITY AXIAL: CPT

## 2018-10-09 DIAGNOSIS — R93.89 ABNORMAL FINDINGS ON DIAGNOSTIC IMAGING OF OTHER SPECIFIED BODY STRUCTURES: Primary | ICD-10-CM

## 2018-10-12 ENCOUNTER — NURSE ONLY (OUTPATIENT)
Dept: FAMILY MEDICINE CLINIC | Age: 78
End: 2018-10-12
Payer: MEDICARE

## 2018-10-12 DIAGNOSIS — R93.89 ABNORMAL FINDINGS ON DIAGNOSTIC IMAGING OF OTHER SPECIFIED BODY STRUCTURES: ICD-10-CM

## 2018-10-12 LAB
PARATHYROID HORMONE INTACT: 57.4 PG/ML (ref 14–72)
TSH REFLEX FT4: 3.49 UIU/ML (ref 0.27–4.2)

## 2018-10-12 PROCEDURE — 36415 COLL VENOUS BLD VENIPUNCTURE: CPT | Performed by: FAMILY MEDICINE

## 2018-10-12 NOTE — PROGRESS NOTES
Blood drawn per physician order. 21 gauge needle used. Location left  ac space w/o incident. Pressure applied until bleeding stopped. Bandaid applied. Patient instructed to call or return if problems with bleeding. 2 tubes drawn.

## 2018-11-06 ENCOUNTER — OFFICE VISIT (OUTPATIENT)
Dept: CARDIOLOGY CLINIC | Age: 78
End: 2018-11-06
Payer: MEDICARE

## 2018-11-06 VITALS
HEIGHT: 69 IN | HEART RATE: 55 BPM | OXYGEN SATURATION: 94 % | BODY MASS INDEX: 23.7 KG/M2 | SYSTOLIC BLOOD PRESSURE: 110 MMHG | WEIGHT: 160 LBS | DIASTOLIC BLOOD PRESSURE: 62 MMHG

## 2018-11-06 DIAGNOSIS — I25.10 ATHEROSCLEROSIS OF NATIVE CORONARY ARTERY OF NATIVE HEART WITHOUT ANGINA PECTORIS: Primary | ICD-10-CM

## 2018-11-06 DIAGNOSIS — E78.00 PURE HYPERCHOLESTEROLEMIA: ICD-10-CM

## 2018-11-06 PROCEDURE — 99214 OFFICE O/P EST MOD 30 MIN: CPT | Performed by: INTERNAL MEDICINE

## 2018-11-06 PROCEDURE — G8427 DOCREV CUR MEDS BY ELIG CLIN: HCPCS | Performed by: INTERNAL MEDICINE

## 2018-11-06 PROCEDURE — G8482 FLU IMMUNIZE ORDER/ADMIN: HCPCS | Performed by: INTERNAL MEDICINE

## 2018-11-06 PROCEDURE — 4040F PNEUMOC VAC/ADMIN/RCVD: CPT | Performed by: INTERNAL MEDICINE

## 2018-11-06 PROCEDURE — 1036F TOBACCO NON-USER: CPT | Performed by: INTERNAL MEDICINE

## 2018-11-06 PROCEDURE — G8598 ASA/ANTIPLAT THER USED: HCPCS | Performed by: INTERNAL MEDICINE

## 2018-11-06 PROCEDURE — 1101F PT FALLS ASSESS-DOCD LE1/YR: CPT | Performed by: INTERNAL MEDICINE

## 2018-11-06 PROCEDURE — G8420 CALC BMI NORM PARAMETERS: HCPCS | Performed by: INTERNAL MEDICINE

## 2018-11-06 PROCEDURE — 1123F ACP DISCUSS/DSCN MKR DOCD: CPT | Performed by: INTERNAL MEDICINE

## 2018-12-12 RX ORDER — DIAZEPAM 5 MG/1
TABLET ORAL
Qty: 60 TABLET | OUTPATIENT
Start: 2018-12-12

## 2019-01-11 DIAGNOSIS — F51.04 INSOMNIA, PSYCHOPHYSIOLOGICAL: ICD-10-CM

## 2019-01-11 RX ORDER — ZOLPIDEM TARTRATE 10 MG/1
TABLET ORAL
Qty: 60 TABLET | Refills: 2 | OUTPATIENT
Start: 2019-01-11 | End: 2019-03-11 | Stop reason: SDUPTHER

## 2019-02-03 ENCOUNTER — HOSPITAL ENCOUNTER (EMERGENCY)
Age: 79
Discharge: HOME | End: 2019-02-03
Payer: MEDICARE

## 2019-02-03 ENCOUNTER — APPOINTMENT (OUTPATIENT)
Dept: CT IMAGING | Age: 79
End: 2019-02-03
Payer: MEDICARE

## 2019-02-03 VITALS
BODY MASS INDEX: 22.51 KG/M2 | WEIGHT: 152 LBS | DIASTOLIC BLOOD PRESSURE: 64 MMHG | HEIGHT: 69 IN | SYSTOLIC BLOOD PRESSURE: 121 MMHG | HEART RATE: 48 BPM | OXYGEN SATURATION: 99 % | RESPIRATION RATE: 16 BRPM | TEMPERATURE: 98.3 F

## 2019-02-03 DIAGNOSIS — N20.1 URETEROLITHIASIS: ICD-10-CM

## 2019-02-03 DIAGNOSIS — R10.9 RIGHT FLANK PAIN: Primary | ICD-10-CM

## 2019-02-03 DIAGNOSIS — N13.30 HYDRONEPHROSIS, UNSPECIFIED HYDRONEPHROSIS TYPE: ICD-10-CM

## 2019-02-03 LAB
ALBUMIN SERPL-MCNC: 4.3 G/DL (ref 3.4–5)
ALP BLD-CCNC: 50 U/L (ref 40–129)
ALT SERPL-CCNC: 17 U/L (ref 10–40)
ANION GAP SERPL CALCULATED.3IONS-SCNC: 13 MMOL/L (ref 3–16)
AST SERPL-CCNC: 20 U/L (ref 15–37)
BASOPHILS ABSOLUTE: 0 K/UL (ref 0–0.2)
BASOPHILS RELATIVE PERCENT: 0.6 %
BILIRUB SERPL-MCNC: 0.6 MG/DL (ref 0–1)
BILIRUBIN DIRECT: <0.2 MG/DL (ref 0–0.3)
BILIRUBIN URINE: NEGATIVE
BILIRUBIN, INDIRECT: NORMAL MG/DL (ref 0–1)
BLOOD, URINE: NEGATIVE
BUN BLDV-MCNC: 16 MG/DL (ref 7–20)
CALCIUM SERPL-MCNC: 8.7 MG/DL (ref 8.3–10.6)
CHLORIDE BLD-SCNC: 102 MMOL/L (ref 99–110)
CLARITY: CLEAR
CO2: 24 MMOL/L (ref 21–32)
COLOR: YELLOW
CREAT SERPL-MCNC: 1.2 MG/DL (ref 0.8–1.3)
EOSINOPHILS ABSOLUTE: 0 K/UL (ref 0–0.6)
EOSINOPHILS RELATIVE PERCENT: 0.7 %
GFR AFRICAN AMERICAN: >60
GFR NON-AFRICAN AMERICAN: 59
GLUCOSE BLD-MCNC: 135 MG/DL (ref 70–99)
GLUCOSE URINE: NEGATIVE MG/DL
HCT VFR BLD CALC: 44 % (ref 40.5–52.5)
HEMOGLOBIN: 15.1 G/DL (ref 13.5–17.5)
KETONES, URINE: 15 MG/DL
LEUKOCYTE ESTERASE, URINE: NEGATIVE
LYMPHOCYTES ABSOLUTE: 0.8 K/UL (ref 1–5.1)
LYMPHOCYTES RELATIVE PERCENT: 16.8 %
MCH RBC QN AUTO: 32.7 PG (ref 26–34)
MCHC RBC AUTO-ENTMCNC: 34.3 G/DL (ref 31–36)
MCV RBC AUTO: 95.4 FL (ref 80–100)
MICROSCOPIC EXAMINATION: ABNORMAL
MONOCYTES ABSOLUTE: 0.4 K/UL (ref 0–1.3)
MONOCYTES RELATIVE PERCENT: 8.7 %
NEUTROPHILS ABSOLUTE: 3.7 K/UL (ref 1.7–7.7)
NEUTROPHILS RELATIVE PERCENT: 73.2 %
NITRITE, URINE: NEGATIVE
PDW BLD-RTO: 13.1 % (ref 12.4–15.4)
PH UA: 6
PLATELET # BLD: 173 K/UL (ref 135–450)
PMV BLD AUTO: 7.7 FL (ref 5–10.5)
POTASSIUM REFLEX MAGNESIUM: 4.3 MMOL/L (ref 3.5–5.1)
PROTEIN UA: NEGATIVE MG/DL
RBC # BLD: 4.61 M/UL (ref 4.2–5.9)
SODIUM BLD-SCNC: 139 MMOL/L (ref 136–145)
SPECIFIC GRAVITY UA: 1.02
TOTAL PROTEIN: 6.8 G/DL (ref 6.4–8.2)
URINE TYPE: ABNORMAL
UROBILINOGEN, URINE: 0.2 E.U./DL
WBC # BLD: 5 K/UL (ref 4–11)

## 2019-02-03 PROCEDURE — 99284 EMERGENCY DEPT VISIT MOD MDM: CPT

## 2019-02-03 PROCEDURE — 80048 BASIC METABOLIC PNL TOTAL CA: CPT

## 2019-02-03 PROCEDURE — 2580000003 HC RX 258: Performed by: PHYSICIAN ASSISTANT

## 2019-02-03 PROCEDURE — 96361 HYDRATE IV INFUSION ADD-ON: CPT

## 2019-02-03 PROCEDURE — 96375 TX/PRO/DX INJ NEW DRUG ADDON: CPT

## 2019-02-03 PROCEDURE — 6360000002 HC RX W HCPCS: Performed by: PHYSICIAN ASSISTANT

## 2019-02-03 PROCEDURE — 74176 CT ABD & PELVIS W/O CONTRAST: CPT

## 2019-02-03 PROCEDURE — 85025 COMPLETE CBC W/AUTO DIFF WBC: CPT

## 2019-02-03 PROCEDURE — 80076 HEPATIC FUNCTION PANEL: CPT

## 2019-02-03 PROCEDURE — 96374 THER/PROPH/DIAG INJ IV PUSH: CPT

## 2019-02-03 PROCEDURE — 81003 URINALYSIS AUTO W/O SCOPE: CPT

## 2019-02-03 RX ORDER — ONDANSETRON 2 MG/ML
4 INJECTION INTRAMUSCULAR; INTRAVENOUS ONCE
Status: COMPLETED | OUTPATIENT
Start: 2019-02-03 | End: 2019-02-03

## 2019-02-03 RX ORDER — TAMSULOSIN HYDROCHLORIDE 0.4 MG/1
0.4 CAPSULE ORAL DAILY
Qty: 5 CAPSULE | Refills: 0 | Status: SHIPPED | OUTPATIENT
Start: 2019-02-03 | End: 2019-03-08

## 2019-02-03 RX ORDER — KETOROLAC TROMETHAMINE 30 MG/ML
15 INJECTION, SOLUTION INTRAMUSCULAR; INTRAVENOUS ONCE
Status: COMPLETED | OUTPATIENT
Start: 2019-02-03 | End: 2019-02-03

## 2019-02-03 RX ORDER — 0.9 % SODIUM CHLORIDE 0.9 %
1000 INTRAVENOUS SOLUTION INTRAVENOUS ONCE
Status: COMPLETED | OUTPATIENT
Start: 2019-02-03 | End: 2019-02-03

## 2019-02-03 RX ORDER — MORPHINE SULFATE 4 MG/ML
4 INJECTION, SOLUTION INTRAMUSCULAR; INTRAVENOUS ONCE
Status: COMPLETED | OUTPATIENT
Start: 2019-02-03 | End: 2019-02-03

## 2019-02-03 RX ORDER — PROMETHAZINE HYDROCHLORIDE 25 MG/1
25 TABLET ORAL EVERY 6 HOURS PRN
Qty: 20 TABLET | Refills: 0 | Status: SHIPPED | OUTPATIENT
Start: 2019-02-03 | End: 2019-02-10

## 2019-02-03 RX ORDER — TRAMADOL HYDROCHLORIDE 50 MG/1
50 TABLET ORAL EVERY 6 HOURS PRN
Qty: 12 TABLET | Refills: 0 | Status: SHIPPED | OUTPATIENT
Start: 2019-02-03 | End: 2019-02-06

## 2019-02-03 RX ORDER — NAPROXEN 250 MG/1
250 TABLET ORAL 2 TIMES DAILY WITH MEALS
Qty: 60 TABLET | Refills: 5 | Status: SHIPPED | OUTPATIENT
Start: 2019-02-03 | End: 2019-03-11 | Stop reason: ALTCHOICE

## 2019-02-03 RX ADMIN — KETOROLAC TROMETHAMINE 15 MG: 30 INJECTION, SOLUTION INTRAMUSCULAR; INTRAVENOUS at 09:39

## 2019-02-03 RX ADMIN — SODIUM CHLORIDE 1000 ML: 9 INJECTION, SOLUTION INTRAVENOUS at 09:37

## 2019-02-03 RX ADMIN — MORPHINE SULFATE 4 MG: 4 INJECTION INTRAVENOUS at 09:40

## 2019-02-03 RX ADMIN — ONDANSETRON 4 MG: 2 INJECTION INTRAMUSCULAR; INTRAVENOUS at 09:38

## 2019-02-03 ASSESSMENT — PAIN DESCRIPTION - LOCATION: LOCATION: FLANK

## 2019-02-03 ASSESSMENT — PAIN DESCRIPTION - DESCRIPTORS: DESCRIPTORS: CONSTANT

## 2019-02-03 ASSESSMENT — PAIN SCALES - GENERAL: PAINLEVEL_OUTOF10: 7

## 2019-02-03 ASSESSMENT — PAIN DESCRIPTION - PAIN TYPE: TYPE: ACUTE PAIN

## 2019-02-03 ASSESSMENT — PAIN DESCRIPTION - ORIENTATION: ORIENTATION: RIGHT

## 2019-02-03 ASSESSMENT — PAIN DESCRIPTION - FREQUENCY: FREQUENCY: CONTINUOUS

## 2019-02-09 ENCOUNTER — ANESTHESIA EVENT (OUTPATIENT)
Dept: OPERATING ROOM | Age: 79
End: 2019-02-09
Payer: MEDICARE

## 2019-02-11 ENCOUNTER — HOSPITAL ENCOUNTER (OUTPATIENT)
Age: 79
Setting detail: OUTPATIENT SURGERY
Discharge: HOME | End: 2019-02-11
Attending: UROLOGY | Admitting: UROLOGY
Payer: MEDICARE

## 2019-02-11 ENCOUNTER — ANESTHESIA (OUTPATIENT)
Dept: OPERATING ROOM | Age: 79
End: 2019-02-11
Payer: MEDICARE

## 2019-02-11 VITALS
HEART RATE: 52 BPM | BODY MASS INDEX: 22.51 KG/M2 | HEIGHT: 69 IN | WEIGHT: 152 LBS | SYSTOLIC BLOOD PRESSURE: 155 MMHG | TEMPERATURE: 97.2 F | RESPIRATION RATE: 14 BRPM | OXYGEN SATURATION: 98 % | DIASTOLIC BLOOD PRESSURE: 79 MMHG

## 2019-02-11 VITALS
DIASTOLIC BLOOD PRESSURE: 64 MMHG | OXYGEN SATURATION: 100 % | RESPIRATION RATE: 11 BRPM | SYSTOLIC BLOOD PRESSURE: 106 MMHG

## 2019-02-11 DIAGNOSIS — Z87.442 HISTORY OF KIDNEY STONES: Primary | ICD-10-CM

## 2019-02-11 PROCEDURE — 3600000014 HC SURGERY LEVEL 4 ADDTL 15MIN: Performed by: UROLOGY

## 2019-02-11 PROCEDURE — 7100000011 HC PHASE II RECOVERY - ADDTL 15 MIN: Performed by: UROLOGY

## 2019-02-11 PROCEDURE — 6360000002 HC RX W HCPCS: Performed by: NURSE ANESTHETIST, CERTIFIED REGISTERED

## 2019-02-11 PROCEDURE — 6370000000 HC RX 637 (ALT 250 FOR IP): Performed by: UROLOGY

## 2019-02-11 PROCEDURE — 82365 CALCULUS SPECTROSCOPY: CPT

## 2019-02-11 PROCEDURE — 2580000003 HC RX 258: Performed by: UROLOGY

## 2019-02-11 PROCEDURE — 2580000003 HC RX 258: Performed by: ANESTHESIOLOGY

## 2019-02-11 PROCEDURE — C1769 GUIDE WIRE: HCPCS | Performed by: UROLOGY

## 2019-02-11 PROCEDURE — 6360000002 HC RX W HCPCS

## 2019-02-11 PROCEDURE — 3700000001 HC ADD 15 MINUTES (ANESTHESIA): Performed by: UROLOGY

## 2019-02-11 PROCEDURE — 7100000000 HC PACU RECOVERY - FIRST 15 MIN: Performed by: UROLOGY

## 2019-02-11 PROCEDURE — 3700000000 HC ANESTHESIA ATTENDED CARE: Performed by: UROLOGY

## 2019-02-11 PROCEDURE — 3600000004 HC SURGERY LEVEL 4 BASE: Performed by: UROLOGY

## 2019-02-11 PROCEDURE — 88300 SURGICAL PATH GROSS: CPT

## 2019-02-11 PROCEDURE — 2500000003 HC RX 250 WO HCPCS: Performed by: ANESTHESIOLOGY

## 2019-02-11 PROCEDURE — 7100000010 HC PHASE II RECOVERY - FIRST 15 MIN: Performed by: UROLOGY

## 2019-02-11 PROCEDURE — 2709999900 HC NON-CHARGEABLE SUPPLY: Performed by: UROLOGY

## 2019-02-11 PROCEDURE — 2500000003 HC RX 250 WO HCPCS: Performed by: NURSE ANESTHETIST, CERTIFIED REGISTERED

## 2019-02-11 PROCEDURE — 7100000001 HC PACU RECOVERY - ADDTL 15 MIN: Performed by: UROLOGY

## 2019-02-11 RX ORDER — TRAMADOL HYDROCHLORIDE 50 MG/1
50 TABLET ORAL EVERY 8 HOURS PRN
Qty: 15 TABLET | Refills: 0 | Status: SHIPPED | OUTPATIENT
Start: 2019-02-11 | End: 2019-02-16

## 2019-02-11 RX ORDER — GLYCOPYRROLATE 0.2 MG/ML
INJECTION INTRAMUSCULAR; INTRAVENOUS PRN
Status: DISCONTINUED | OUTPATIENT
Start: 2019-02-11 | End: 2019-02-11 | Stop reason: SDUPTHER

## 2019-02-11 RX ORDER — MAGNESIUM HYDROXIDE 1200 MG/15ML
LIQUID ORAL PRN
Status: DISCONTINUED | OUTPATIENT
Start: 2019-02-11 | End: 2019-02-11 | Stop reason: ALTCHOICE

## 2019-02-11 RX ORDER — GLYCOPYRROLATE 0.2 MG/ML
INJECTION INTRAMUSCULAR; INTRAVENOUS
Status: DISCONTINUED
Start: 2019-02-11 | End: 2019-02-11 | Stop reason: HOSPADM

## 2019-02-11 RX ORDER — GLYCOPYRROLATE 0.2 MG/ML
0.2 INJECTION INTRAMUSCULAR; INTRAVENOUS ONCE
Status: COMPLETED | OUTPATIENT
Start: 2019-02-11 | End: 2019-02-11

## 2019-02-11 RX ORDER — SODIUM CHLORIDE, SODIUM LACTATE, POTASSIUM CHLORIDE, CALCIUM CHLORIDE 600; 310; 30; 20 MG/100ML; MG/100ML; MG/100ML; MG/100ML
INJECTION, SOLUTION INTRAVENOUS CONTINUOUS
Status: DISCONTINUED | OUTPATIENT
Start: 2019-02-11 | End: 2019-02-11 | Stop reason: HOSPADM

## 2019-02-11 RX ORDER — ONDANSETRON 2 MG/ML
INJECTION INTRAMUSCULAR; INTRAVENOUS PRN
Status: DISCONTINUED | OUTPATIENT
Start: 2019-02-11 | End: 2019-02-11 | Stop reason: SDUPTHER

## 2019-02-11 RX ORDER — PROPOFOL 10 MG/ML
INJECTION, EMULSION INTRAVENOUS PRN
Status: DISCONTINUED | OUTPATIENT
Start: 2019-02-11 | End: 2019-02-11 | Stop reason: SDUPTHER

## 2019-02-11 RX ORDER — LIDOCAINE HYDROCHLORIDE 20 MG/ML
INJECTION, SOLUTION INFILTRATION; PERINEURAL PRN
Status: DISCONTINUED | OUTPATIENT
Start: 2019-02-11 | End: 2019-02-11 | Stop reason: SDUPTHER

## 2019-02-11 RX ORDER — NALOXONE HYDROCHLORIDE 0.4 MG/ML
INJECTION, SOLUTION INTRAMUSCULAR; INTRAVENOUS; SUBCUTANEOUS PRN
Status: DISCONTINUED | OUTPATIENT
Start: 2019-02-11 | End: 2019-02-11 | Stop reason: SDUPTHER

## 2019-02-11 RX ORDER — DEXAMETHASONE SODIUM PHOSPHATE 4 MG/ML
INJECTION, SOLUTION INTRA-ARTICULAR; INTRALESIONAL; INTRAMUSCULAR; INTRAVENOUS; SOFT TISSUE PRN
Status: DISCONTINUED | OUTPATIENT
Start: 2019-02-11 | End: 2019-02-11 | Stop reason: SDUPTHER

## 2019-02-11 RX ORDER — ULTRASOUND COUPLING MEDIUM
GEL (GRAM) TOPICAL PRN
Status: DISCONTINUED | OUTPATIENT
Start: 2019-02-11 | End: 2019-02-11 | Stop reason: ALTCHOICE

## 2019-02-11 RX ORDER — FENTANYL CITRATE 50 UG/ML
INJECTION, SOLUTION INTRAMUSCULAR; INTRAVENOUS PRN
Status: DISCONTINUED | OUTPATIENT
Start: 2019-02-11 | End: 2019-02-11 | Stop reason: SDUPTHER

## 2019-02-11 RX ORDER — SULFAMETHOXAZOLE AND TRIMETHOPRIM 400; 80 MG/1; MG/1
1 TABLET ORAL 2 TIMES DAILY
Qty: 8 TABLET | Refills: 0 | Status: SHIPPED | OUTPATIENT
Start: 2019-02-11 | End: 2019-02-15

## 2019-02-11 RX ORDER — TAMSULOSIN HYDROCHLORIDE 0.4 MG/1
0.4 CAPSULE ORAL 2 TIMES DAILY
Qty: 60 CAPSULE | Refills: 1 | Status: ON HOLD | OUTPATIENT
Start: 2019-02-11 | End: 2019-08-03

## 2019-02-11 RX ADMIN — PROPOFOL 300 MG: 10 INJECTION, EMULSION INTRAVENOUS at 12:35

## 2019-02-11 RX ADMIN — LIDOCAINE HYDROCHLORIDE 60 MG: 20 INJECTION, SOLUTION INFILTRATION; PERINEURAL at 12:35

## 2019-02-11 RX ADMIN — GLYCOPYRROLATE 0.2 MG: 0.2 INJECTION, SOLUTION INTRAMUSCULAR; INTRAVENOUS at 13:06

## 2019-02-11 RX ADMIN — FENTANYL CITRATE 50 MCG: 50 INJECTION INTRAMUSCULAR; INTRAVENOUS at 12:30

## 2019-02-11 RX ADMIN — DEXAMETHASONE SODIUM PHOSPHATE 4 MG: 4 INJECTION, SOLUTION INTRAMUSCULAR; INTRAVENOUS at 12:50

## 2019-02-11 RX ADMIN — Medication 2 G: at 12:25

## 2019-02-11 RX ADMIN — ONDANSETRON 4 MG: 2 INJECTION, SOLUTION INTRAMUSCULAR; INTRAVENOUS at 12:50

## 2019-02-11 RX ADMIN — GLYCOPYRROLATE 0.2 MG: 0.2 INJECTION, SOLUTION INTRAMUSCULAR; INTRAVENOUS at 14:16

## 2019-02-11 RX ADMIN — LIDOCAINE HYDROCHLORIDE 0.3 ML: 10 INJECTION, SOLUTION EPIDURAL; INFILTRATION; INTRACAUDAL; PERINEURAL at 11:28

## 2019-02-11 RX ADMIN — NALOXONE HYDROCHLORIDE 0.1 MG: 0.4 INJECTION, SOLUTION INTRAMUSCULAR; INTRAVENOUS; SUBCUTANEOUS at 13:05

## 2019-02-11 RX ADMIN — SODIUM CHLORIDE, POTASSIUM CHLORIDE, SODIUM LACTATE AND CALCIUM CHLORIDE: 600; 310; 30; 20 INJECTION, SOLUTION INTRAVENOUS at 11:28

## 2019-02-11 RX ADMIN — SODIUM CHLORIDE, POTASSIUM CHLORIDE, SODIUM LACTATE AND CALCIUM CHLORIDE: 600; 310; 30; 20 INJECTION, SOLUTION INTRAVENOUS at 12:30

## 2019-02-11 ASSESSMENT — PULMONARY FUNCTION TESTS
PIF_VALUE: 15
PIF_VALUE: 14
PIF_VALUE: 1
PIF_VALUE: 3
PIF_VALUE: 3
PIF_VALUE: 1
PIF_VALUE: 1
PIF_VALUE: 23
PIF_VALUE: 18
PIF_VALUE: 21
PIF_VALUE: 9
PIF_VALUE: 5
PIF_VALUE: 17
PIF_VALUE: 24
PIF_VALUE: 16
PIF_VALUE: 17
PIF_VALUE: 3
PIF_VALUE: 6
PIF_VALUE: 16
PIF_VALUE: 18
PIF_VALUE: 1
PIF_VALUE: 16
PIF_VALUE: 25
PIF_VALUE: 18
PIF_VALUE: 8
PIF_VALUE: 17
PIF_VALUE: 4
PIF_VALUE: 8
PIF_VALUE: 16
PIF_VALUE: 8
PIF_VALUE: 5
PIF_VALUE: 13
PIF_VALUE: 17
PIF_VALUE: 24
PIF_VALUE: 14
PIF_VALUE: 3
PIF_VALUE: 26
PIF_VALUE: 18

## 2019-02-12 ENCOUNTER — TELEPHONE (OUTPATIENT)
Dept: CARDIOLOGY CLINIC | Age: 79
End: 2019-02-12

## 2019-02-13 DIAGNOSIS — G25.81 RESTLESS LEG SYNDROME: ICD-10-CM

## 2019-02-13 RX ORDER — DIAZEPAM 10 MG/1
TABLET ORAL
Qty: 30 TABLET | Refills: 2 | OUTPATIENT
Start: 2019-02-13 | End: 2019-03-11 | Stop reason: SDUPTHER

## 2019-02-14 LAB
CALCULI COMPOSITION: NORMAL
MASS: 76 MG
STONE DESCRIPTION: NORMAL
STONE NUMBER: 1
STONE SIZE: NORMAL MM

## 2019-03-04 ENCOUNTER — TELEPHONE (OUTPATIENT)
Dept: FAMILY MEDICINE CLINIC | Age: 79
End: 2019-03-04

## 2019-03-08 DIAGNOSIS — M85.88 OSTEOPENIA OF SPINE: ICD-10-CM

## 2019-03-11 ENCOUNTER — OFFICE VISIT (OUTPATIENT)
Dept: FAMILY MEDICINE CLINIC | Age: 79
End: 2019-03-11
Payer: MEDICARE

## 2019-03-11 VITALS
HEART RATE: 50 BPM | BODY MASS INDEX: 23.72 KG/M2 | DIASTOLIC BLOOD PRESSURE: 74 MMHG | WEIGHT: 160.6 LBS | SYSTOLIC BLOOD PRESSURE: 126 MMHG | TEMPERATURE: 97.5 F | OXYGEN SATURATION: 98 %

## 2019-03-11 DIAGNOSIS — E78.00 PURE HYPERCHOLESTEROLEMIA: ICD-10-CM

## 2019-03-11 DIAGNOSIS — F51.04 INSOMNIA, PSYCHOPHYSIOLOGICAL: ICD-10-CM

## 2019-03-11 DIAGNOSIS — G25.81 RESTLESS LEGS SYNDROME: ICD-10-CM

## 2019-03-11 DIAGNOSIS — J30.9 ALLERGIC SINUSITIS: ICD-10-CM

## 2019-03-11 DIAGNOSIS — G25.81 RESTLESS LEG SYNDROME: ICD-10-CM

## 2019-03-11 DIAGNOSIS — N32.0 BLADDER OUTLET OBSTRUCTION: ICD-10-CM

## 2019-03-11 DIAGNOSIS — I25.10 ATHEROSCLEROSIS OF NATIVE CORONARY ARTERY OF NATIVE HEART WITHOUT ANGINA PECTORIS: Primary | ICD-10-CM

## 2019-03-11 LAB
CHOLESTEROL, TOTAL: 172 MG/DL (ref 0–199)
HDLC SERPL-MCNC: 53 MG/DL (ref 40–60)
LDL CHOLESTEROL CALCULATED: 102 MG/DL
TRIGL SERPL-MCNC: 83 MG/DL (ref 0–150)
VLDLC SERPL CALC-MCNC: 17 MG/DL

## 2019-03-11 PROCEDURE — G8427 DOCREV CUR MEDS BY ELIG CLIN: HCPCS | Performed by: FAMILY MEDICINE

## 2019-03-11 PROCEDURE — 1123F ACP DISCUSS/DSCN MKR DOCD: CPT | Performed by: FAMILY MEDICINE

## 2019-03-11 PROCEDURE — G8598 ASA/ANTIPLAT THER USED: HCPCS | Performed by: FAMILY MEDICINE

## 2019-03-11 PROCEDURE — G8420 CALC BMI NORM PARAMETERS: HCPCS | Performed by: FAMILY MEDICINE

## 2019-03-11 PROCEDURE — 99215 OFFICE O/P EST HI 40 MIN: CPT | Performed by: FAMILY MEDICINE

## 2019-03-11 PROCEDURE — G8482 FLU IMMUNIZE ORDER/ADMIN: HCPCS | Performed by: FAMILY MEDICINE

## 2019-03-11 PROCEDURE — 93000 ELECTROCARDIOGRAM COMPLETE: CPT | Performed by: FAMILY MEDICINE

## 2019-03-11 PROCEDURE — 4040F PNEUMOC VAC/ADMIN/RCVD: CPT | Performed by: FAMILY MEDICINE

## 2019-03-11 PROCEDURE — 36415 COLL VENOUS BLD VENIPUNCTURE: CPT | Performed by: FAMILY MEDICINE

## 2019-03-11 PROCEDURE — 1101F PT FALLS ASSESS-DOCD LE1/YR: CPT | Performed by: FAMILY MEDICINE

## 2019-03-11 PROCEDURE — 1036F TOBACCO NON-USER: CPT | Performed by: FAMILY MEDICINE

## 2019-03-11 RX ORDER — MONTELUKAST SODIUM 10 MG/1
10 TABLET ORAL DAILY
Qty: 30 TABLET | Refills: 5 | Status: SHIPPED | OUTPATIENT
Start: 2019-03-11 | End: 2019-10-01 | Stop reason: SDUPTHER

## 2019-03-11 RX ORDER — ZOLPIDEM TARTRATE 10 MG/1
TABLET ORAL
Qty: 60 TABLET | Refills: 2 | Status: SHIPPED | OUTPATIENT
Start: 2019-03-11 | End: 2019-07-11 | Stop reason: SDUPTHER

## 2019-03-11 RX ORDER — DIAZEPAM 10 MG/1
TABLET ORAL
Qty: 30 TABLET | Refills: 2 | Status: ON HOLD | OUTPATIENT
Start: 2019-03-11 | End: 2019-08-03

## 2019-03-11 ASSESSMENT — PATIENT HEALTH QUESTIONNAIRE - PHQ9
SUM OF ALL RESPONSES TO PHQ9 QUESTIONS 1 & 2: 0
1. LITTLE INTEREST OR PLEASURE IN DOING THINGS: 0
SUM OF ALL RESPONSES TO PHQ QUESTIONS 1-9: 0
2. FEELING DOWN, DEPRESSED OR HOPELESS: 0
SUM OF ALL RESPONSES TO PHQ QUESTIONS 1-9: 0

## 2019-03-13 NOTE — PROGRESS NOTES
PRE OP INSTRUCTION SHEET   1. Do not eat or drink anything after 12 midnight  prior to surgery. This includes no water, chewing gum or mints. 2. Take the following pills will a small sip of water (see MAR)                                        3. Aspirin, Ibuprofen, Advil, Naproxen, Vitamin E, fish oil and other Anti-inflammatory products should be stopped for 5 days before surgery or as directed by your physician. 4. Check with your Doctor regarding stopping Plavix, Coumadin, Lovenox, Fragmin or other blood thinners   5. Do not smoke, and do not drink any alcoholic beverages 24 hours prior to surgery. This includes NA Beer. 6. You may brush your teeth and gargle the morning of surgery. DO NOT SWALLOW WATER   7. You MUST make arrangements for a responsible adult to take you home after your surgery. You will not be allowed to leave alone or drive yourself home. It is strongly suggested someone stay with you the first 24 hrs. Your surgery will be cancelled if you do not have a ride home. 8. A parent/legal guardian must accompany a child scheduled for surgery and plan to stay at the hospital until the child is discharged. Please do not bring other children with you. 9. Please wear simple, loose fitting clothing to the hospital.  Opal Garciaprincess not bring valuables (money, credit cards, checkbooks, etc.) Do not wear any makeup (including no eye makeup) or nail polish on your fingers or toes. 10. DO NOT wear any jewelry or piercings on day of surgery. All body piercing jewelry must be removed. 11. If you have dentures,glasses, or contacts they will be removed before going to the OR; we will provide you a container. 12. Please see your family doctor/and cardiologist for a history & physical and/or concerning medications. Bring any test results/reports from your physician's office. Have history and labs faxed to [phone].  Remember to bring Blood Bank bracelet on the day of surgery. 14. If you have a Living Will and Durable Power of  for Healthcare, please bring in a copy. 13. Notify your Surgeon if you develop any illness between now and surgery  time, cough, cold, fever, sore throat, nausea, vomiting, etc.  Please notify your surgeon if you experience dizziness, shortness of breath or blurred vision between now & the time of your surgery   16. DO NOT shave your operative site 96 hours prior to surgery. For face & neck surgery, men may use an electric razor 48 hours prior to surgery. 17. Shower with _x__Antibacterial soap (x_chlorhexidine for total joint  Pt's) shower two times before surgery.(the morning of and the night before. 18. To provide excellent care visitors will be limited to one in the room at any given time.   Please call pre admission testing if you any further questions [phone] or [phone]

## 2019-03-18 ENCOUNTER — ANESTHESIA EVENT (OUTPATIENT)
Dept: OPERATING ROOM | Age: 79
End: 2019-03-18
Payer: MEDICARE

## 2019-03-18 ENCOUNTER — ANESTHESIA (OUTPATIENT)
Dept: OPERATING ROOM | Age: 79
End: 2019-03-18
Payer: MEDICARE

## 2019-03-18 ENCOUNTER — HOSPITAL ENCOUNTER (OUTPATIENT)
Age: 79
Setting detail: OUTPATIENT SURGERY
Discharge: HOME | End: 2019-03-18
Attending: UROLOGY | Admitting: UROLOGY
Payer: MEDICARE

## 2019-03-18 VITALS
HEIGHT: 69 IN | BODY MASS INDEX: 22.51 KG/M2 | RESPIRATION RATE: 14 BRPM | OXYGEN SATURATION: 96 % | SYSTOLIC BLOOD PRESSURE: 115 MMHG | WEIGHT: 152 LBS | DIASTOLIC BLOOD PRESSURE: 58 MMHG | TEMPERATURE: 97 F | HEART RATE: 42 BPM

## 2019-03-18 VITALS — SYSTOLIC BLOOD PRESSURE: 131 MMHG | OXYGEN SATURATION: 97 % | DIASTOLIC BLOOD PRESSURE: 64 MMHG

## 2019-03-18 DIAGNOSIS — N32.0 BLADDER OUTLET OBSTRUCTION: Primary | ICD-10-CM

## 2019-03-18 PROCEDURE — 6370000000 HC RX 637 (ALT 250 FOR IP): Performed by: ANESTHESIOLOGY

## 2019-03-18 PROCEDURE — 7100000001 HC PACU RECOVERY - ADDTL 15 MIN: Performed by: UROLOGY

## 2019-03-18 PROCEDURE — 3600000014 HC SURGERY LEVEL 4 ADDTL 15MIN: Performed by: UROLOGY

## 2019-03-18 PROCEDURE — 6370000000 HC RX 637 (ALT 250 FOR IP)

## 2019-03-18 PROCEDURE — 3600000004 HC SURGERY LEVEL 4 BASE: Performed by: UROLOGY

## 2019-03-18 PROCEDURE — 7100000000 HC PACU RECOVERY - FIRST 15 MIN: Performed by: UROLOGY

## 2019-03-18 PROCEDURE — 2709999900 HC NON-CHARGEABLE SUPPLY: Performed by: UROLOGY

## 2019-03-18 PROCEDURE — 3700000000 HC ANESTHESIA ATTENDED CARE: Performed by: UROLOGY

## 2019-03-18 PROCEDURE — 6360000002 HC RX W HCPCS: Performed by: UROLOGY

## 2019-03-18 PROCEDURE — 6360000002 HC RX W HCPCS: Performed by: ANESTHESIOLOGY

## 2019-03-18 PROCEDURE — C1771 REP DEV, URINARY, W/SLING: HCPCS | Performed by: UROLOGY

## 2019-03-18 PROCEDURE — 2500000003 HC RX 250 WO HCPCS: Performed by: ANESTHESIOLOGY

## 2019-03-18 PROCEDURE — 6360000002 HC RX W HCPCS: Performed by: NURSE ANESTHETIST, CERTIFIED REGISTERED

## 2019-03-18 PROCEDURE — 7100000011 HC PHASE II RECOVERY - ADDTL 15 MIN: Performed by: UROLOGY

## 2019-03-18 PROCEDURE — 2580000003 HC RX 258: Performed by: ANESTHESIOLOGY

## 2019-03-18 PROCEDURE — 3700000001 HC ADD 15 MINUTES (ANESTHESIA): Performed by: UROLOGY

## 2019-03-18 PROCEDURE — 7100000010 HC PHASE II RECOVERY - FIRST 15 MIN: Performed by: UROLOGY

## 2019-03-18 DEVICE — SYSTEM URO W/ IMPL DEL DEV FOR TREAT OF URIN OUTFLO: Type: IMPLANTABLE DEVICE | Site: PROSTATE | Status: FUNCTIONAL

## 2019-03-18 RX ORDER — SODIUM CHLORIDE, SODIUM LACTATE, POTASSIUM CHLORIDE, CALCIUM CHLORIDE 600; 310; 30; 20 MG/100ML; MG/100ML; MG/100ML; MG/100ML
INJECTION, SOLUTION INTRAVENOUS CONTINUOUS
Status: DISCONTINUED | OUTPATIENT
Start: 2019-03-18 | End: 2019-03-18 | Stop reason: HOSPADM

## 2019-03-18 RX ORDER — LIDOCAINE HYDROCHLORIDE 10 MG/ML
1 INJECTION, SOLUTION EPIDURAL; INFILTRATION; INTRACAUDAL; PERINEURAL
Status: COMPLETED | OUTPATIENT
Start: 2019-03-18 | End: 2019-03-18

## 2019-03-18 RX ORDER — PROPOFOL 10 MG/ML
INJECTION, EMULSION INTRAVENOUS PRN
Status: DISCONTINUED | OUTPATIENT
Start: 2019-03-18 | End: 2019-03-18 | Stop reason: SDUPTHER

## 2019-03-18 RX ORDER — ONDANSETRON 2 MG/ML
4 INJECTION INTRAMUSCULAR; INTRAVENOUS EVERY 30 MIN PRN
Status: DISCONTINUED | OUTPATIENT
Start: 2019-03-18 | End: 2019-03-18 | Stop reason: HOSPADM

## 2019-03-18 RX ORDER — OXYCODONE HYDROCHLORIDE AND ACETAMINOPHEN 5; 325 MG/1; MG/1
2 TABLET ORAL PRN
Status: COMPLETED | OUTPATIENT
Start: 2019-03-18 | End: 2019-03-18

## 2019-03-18 RX ORDER — DIPHENHYDRAMINE HYDROCHLORIDE 50 MG/ML
6.25 INJECTION INTRAMUSCULAR; INTRAVENOUS
Status: DISCONTINUED | OUTPATIENT
Start: 2019-03-18 | End: 2019-03-18 | Stop reason: HOSPADM

## 2019-03-18 RX ORDER — LABETALOL HYDROCHLORIDE 5 MG/ML
5 INJECTION, SOLUTION INTRAVENOUS
Status: DISCONTINUED | OUTPATIENT
Start: 2019-03-18 | End: 2019-03-18 | Stop reason: HOSPADM

## 2019-03-18 RX ORDER — HYDROMORPHONE HCL 110MG/55ML
0.5 PATIENT CONTROLLED ANALGESIA SYRINGE INTRAVENOUS EVERY 5 MIN PRN
Status: DISCONTINUED | OUTPATIENT
Start: 2019-03-18 | End: 2019-03-18 | Stop reason: HOSPADM

## 2019-03-18 RX ORDER — PHENAZOPYRIDINE HYDROCHLORIDE 100 MG/1
200 TABLET, FILM COATED ORAL
Status: COMPLETED | OUTPATIENT
Start: 2019-03-18 | End: 2019-03-18

## 2019-03-18 RX ORDER — HYDROMORPHONE HYDROCHLORIDE 2 MG/1
2 TABLET ORAL EVERY 8 HOURS PRN
Qty: 16 TABLET | Refills: 0 | Status: SHIPPED | OUTPATIENT
Start: 2019-03-18 | End: 2019-03-23

## 2019-03-18 RX ORDER — HYDRALAZINE HYDROCHLORIDE 20 MG/ML
5 INJECTION INTRAMUSCULAR; INTRAVENOUS EVERY 30 MIN PRN
Status: DISCONTINUED | OUTPATIENT
Start: 2019-03-18 | End: 2019-03-18 | Stop reason: HOSPADM

## 2019-03-18 RX ORDER — SODIUM CHLORIDE 0.9 % (FLUSH) 0.9 %
10 SYRINGE (ML) INJECTION EVERY 12 HOURS SCHEDULED
Status: DISCONTINUED | OUTPATIENT
Start: 2019-03-18 | End: 2019-03-18 | Stop reason: HOSPADM

## 2019-03-18 RX ORDER — FENTANYL CITRATE 50 UG/ML
INJECTION, SOLUTION INTRAMUSCULAR; INTRAVENOUS PRN
Status: DISCONTINUED | OUTPATIENT
Start: 2019-03-18 | End: 2019-03-18 | Stop reason: SDUPTHER

## 2019-03-18 RX ORDER — SODIUM CHLORIDE, SODIUM LACTATE, POTASSIUM CHLORIDE, CALCIUM CHLORIDE 600; 310; 30; 20 MG/100ML; MG/100ML; MG/100ML; MG/100ML
INJECTION, SOLUTION INTRAVENOUS
Status: DISCONTINUED
Start: 2019-03-18 | End: 2019-03-18 | Stop reason: HOSPADM

## 2019-03-18 RX ORDER — OXYCODONE HYDROCHLORIDE AND ACETAMINOPHEN 5; 325 MG/1; MG/1
1 TABLET ORAL PRN
Status: COMPLETED | OUTPATIENT
Start: 2019-03-18 | End: 2019-03-18

## 2019-03-18 RX ORDER — CIPROFLOXACIN 250 MG/1
250 TABLET, FILM COATED ORAL 2 TIMES DAILY
Qty: 10 TABLET | Refills: 0 | Status: SHIPPED | OUTPATIENT
Start: 2019-03-18 | End: 2019-03-23

## 2019-03-18 RX ORDER — MEPERIDINE HYDROCHLORIDE 25 MG/ML
12.5 INJECTION INTRAMUSCULAR; INTRAVENOUS; SUBCUTANEOUS EVERY 5 MIN PRN
Status: DISCONTINUED | OUTPATIENT
Start: 2019-03-18 | End: 2019-03-18 | Stop reason: HOSPADM

## 2019-03-18 RX ORDER — PHENAZOPYRIDINE HYDROCHLORIDE 100 MG/1
TABLET, FILM COATED ORAL
Status: COMPLETED
Start: 2019-03-18 | End: 2019-03-18

## 2019-03-18 RX ORDER — HYDROMORPHONE HCL 110MG/55ML
0.5 PATIENT CONTROLLED ANALGESIA SYRINGE INTRAVENOUS EVERY 10 MIN PRN
Status: DISCONTINUED | OUTPATIENT
Start: 2019-03-18 | End: 2019-03-18 | Stop reason: HOSPADM

## 2019-03-18 RX ORDER — SODIUM CHLORIDE 0.9 % (FLUSH) 0.9 %
10 SYRINGE (ML) INJECTION PRN
Status: DISCONTINUED | OUTPATIENT
Start: 2019-03-18 | End: 2019-03-18 | Stop reason: HOSPADM

## 2019-03-18 RX ADMIN — Medication 2 G: at 13:18

## 2019-03-18 RX ADMIN — PROPOFOL 150 MG: 10 INJECTION, EMULSION INTRAVENOUS at 13:36

## 2019-03-18 RX ADMIN — PHENAZOPYRIDINE HYDROCHLORIDE 200 MG: 100 TABLET ORAL at 14:28

## 2019-03-18 RX ADMIN — ONDANSETRON 4 MG: 2 INJECTION INTRAMUSCULAR; INTRAVENOUS at 14:17

## 2019-03-18 RX ADMIN — PHENAZOPYRIDINE HYDROCHLORIDE 200 MG: 100 TABLET, FILM COATED ORAL at 14:28

## 2019-03-18 RX ADMIN — LIDOCAINE HYDROCHLORIDE 0.2 ML: 10 INJECTION, SOLUTION EPIDURAL; INFILTRATION; INTRACAUDAL; PERINEURAL at 12:26

## 2019-03-18 RX ADMIN — HYDROMORPHONE HYDROCHLORIDE 0.5 MG: 2 INJECTION, SOLUTION INTRAMUSCULAR; INTRAVENOUS; SUBCUTANEOUS at 14:18

## 2019-03-18 RX ADMIN — FENTANYL CITRATE 100 MCG: 50 INJECTION INTRAMUSCULAR; INTRAVENOUS at 13:35

## 2019-03-18 RX ADMIN — SODIUM CHLORIDE, POTASSIUM CHLORIDE, SODIUM LACTATE AND CALCIUM CHLORIDE: 600; 310; 30; 20 INJECTION, SOLUTION INTRAVENOUS at 12:26

## 2019-03-18 RX ADMIN — OXYCODONE HYDROCHLORIDE AND ACETAMINOPHEN 2 TABLET: 5; 325 TABLET ORAL at 15:06

## 2019-03-18 RX ADMIN — HYDROMORPHONE HYDROCHLORIDE 0.5 MG: 2 INJECTION, SOLUTION INTRAMUSCULAR; INTRAVENOUS; SUBCUTANEOUS at 14:44

## 2019-03-18 ASSESSMENT — PAIN DESCRIPTION - DESCRIPTORS: DESCRIPTORS: BURNING

## 2019-03-18 ASSESSMENT — PAIN SCALES - GENERAL
PAINLEVEL_OUTOF10: 7
PAINLEVEL_OUTOF10: 6
PAINLEVEL_OUTOF10: 5
PAINLEVEL_OUTOF10: 3
PAINLEVEL_OUTOF10: 5

## 2019-03-18 ASSESSMENT — PULMONARY FUNCTION TESTS
PIF_VALUE: 1
PIF_VALUE: 11
PIF_VALUE: 13
PIF_VALUE: 0
PIF_VALUE: 6
PIF_VALUE: 21
PIF_VALUE: 13
PIF_VALUE: 1
PIF_VALUE: 12
PIF_VALUE: 11
PIF_VALUE: 2
PIF_VALUE: 2
PIF_VALUE: 13
PIF_VALUE: 11
PIF_VALUE: 1
PIF_VALUE: 11
PIF_VALUE: 20
PIF_VALUE: 12
PIF_VALUE: 13
PIF_VALUE: 3
PIF_VALUE: 11
PIF_VALUE: 12
PIF_VALUE: 0
PIF_VALUE: 0
PIF_VALUE: 11
PIF_VALUE: 1
PIF_VALUE: 13
PIF_VALUE: 3
PIF_VALUE: 3
PIF_VALUE: 2
PIF_VALUE: 1
PIF_VALUE: 20
PIF_VALUE: 14
PIF_VALUE: 10
PIF_VALUE: 11
PIF_VALUE: 12
PIF_VALUE: 1
PIF_VALUE: 6
PIF_VALUE: 7

## 2019-03-18 ASSESSMENT — PAIN - FUNCTIONAL ASSESSMENT: PAIN_FUNCTIONAL_ASSESSMENT: 0-10

## 2019-03-18 ASSESSMENT — PAIN DESCRIPTION - PROGRESSION: CLINICAL_PROGRESSION: GRADUALLY IMPROVING

## 2019-03-18 ASSESSMENT — PAIN DESCRIPTION - ONSET: ONSET: ON-GOING

## 2019-03-18 ASSESSMENT — PAIN DESCRIPTION - PAIN TYPE: TYPE: ACUTE PAIN

## 2019-03-18 ASSESSMENT — PAIN DESCRIPTION - LOCATION: LOCATION: PENIS

## 2019-03-18 ASSESSMENT — PAIN DESCRIPTION - FREQUENCY: FREQUENCY: INTERMITTENT

## 2019-03-18 NOTE — PROGRESS NOTES
Pt is alert and oriented, stable for phase 2 of care, he has received a total of 1mg dilaudid iv, 4mg zofran iv, percocet x 2 tabs po, pain rate is now a 1 when still and 5 at times when moving per pt, he had 150mls drained out of leg bag, it was cherry color, malodorous, he has drank 2-120mls of orange juice cups, and 1 120mls of water in pacu phase, wife is now at bedside, stat lock placed on cath in pacu, warming device bare hugger used, and curtains drawn to decrease lighting.  Family now at bedside

## 2019-03-18 NOTE — PROGRESS NOTES
Pt is having a lot of discomfort in the tunde area, stating \": My Beacher Robert Hurts\", he does have an 25 [place] Cath that was placed during surgery, per verbal order with repeat back, Dr Seema Correa ordered Pyridium 200mg PO x 1 dose now, pt has also already received 0.5mg dilaudid iv for pain rate of 7, and 4mg zofran iv push

## 2019-03-19 NOTE — OP NOTE
[address]                 [address] [facility], [place]                                OPERATIVE REPORT    PATIENT NAME: Karmen Gross                    :        1940  MED REC NO:   [id number]                          ROOM:  ACCOUNT NO:   [de-identified]                           ADMIT DATE: 2019  PROVIDER:     Lorena Martinez MD    DATE OF PROCEDURE:  2019    PREOPERATIVE DIAGNOSIS:  BPH. POSTOPERATIVE DIAGNOSIS:  BPH. OPERATIONS PERFORMED:  1. Cystoscopy with UroLift implantation x 6.  2.  Cystoscopy with fulguration of bleeding points. PRIMARY SURGEON:  Lorena Martinez MD    ANESTHESIA:  General.    OPERATIVE FINDINGS:  BPH. HISTORY:  This is a 68-year-old white male with a history of obstructive  and irritative voiding symptoms. He has been placed on medical therapy  for some time and is now beginning to fail this and also taking  additional medications over and above the recommended daily usage. Options were discussed for his problem and he elected to undergo a  cystourethroscopy for evaluation and treatment either using minimal  invasive surgery or traditional TURP. Risks, benefits, and expected  outcomes as well as alternatives were discussed in detail in  preoperative consultation. DETAILS OF THE PROCEDURE:  After obtaining informed consent, the patient  was taken to the operative suite. He was given a general anesthetic and  placed on the operative table in a modified dorsal lithotomy position. All pressure points were well padded and pneumatic boots had been  applied. Cystourethroscopy was then performed with both 30- and  70-degree lenses. BPH was noted as well as a small median lobe, but  there was no significant median lobe extension into the patient's  bladder. At this point, I decided to proceed forth with the UroLift  implantation.   The first two implants were placed using the UroLift  cystoscope

## 2019-07-11 DIAGNOSIS — F51.04 INSOMNIA, PSYCHOPHYSIOLOGICAL: ICD-10-CM

## 2019-07-12 RX ORDER — ZOLPIDEM TARTRATE 10 MG/1
TABLET ORAL
Qty: 60 TABLET | Refills: 2 | Status: SHIPPED | OUTPATIENT
Start: 2019-07-12 | End: 2019-10-01 | Stop reason: SDUPTHER

## 2019-07-12 NOTE — TELEPHONE ENCOUNTER
Date of last refill of this med was 3/11/19, # of pills given 60 and # of refills given 2. Their next appointment is 10/1/19, the last date patient was seen was 3/11/19. Does patient have medication agreement on file? Yes  Has drug screen been done in last 12 months if needed?  no

## 2019-08-02 ENCOUNTER — APPOINTMENT (OUTPATIENT)
Dept: GENERAL RADIOLOGY | Age: 79
End: 2019-08-02
Payer: MEDICARE

## 2019-08-02 ENCOUNTER — HOSPITAL ENCOUNTER (OUTPATIENT)
Age: 79
Setting detail: OBSERVATION
Discharge: LEFT BEFORE DISCHARGE | End: 2019-08-03
Attending: EMERGENCY MEDICINE | Admitting: INTERNAL MEDICINE
Payer: MEDICARE

## 2019-08-02 DIAGNOSIS — R07.9 CHEST PAIN, UNSPECIFIED TYPE: Primary | ICD-10-CM

## 2019-08-02 LAB
A/G RATIO: 2.1 (ref 1.1–2.2)
ALBUMIN SERPL-MCNC: 4.4 G/DL (ref 3.4–5)
ALP BLD-CCNC: 53 U/L (ref 40–129)
ALT SERPL-CCNC: 17 U/L (ref 10–40)
ANION GAP SERPL CALCULATED.3IONS-SCNC: 14 MMOL/L (ref 3–16)
AST SERPL-CCNC: 20 U/L (ref 15–37)
BASOPHILS ABSOLUTE: 0.1 K/UL (ref 0–0.2)
BASOPHILS RELATIVE PERCENT: 0.8 %
BILIRUB SERPL-MCNC: 0.4 MG/DL (ref 0–1)
BUN BLDV-MCNC: 22 MG/DL (ref 7–20)
CALCIUM SERPL-MCNC: 9.2 MG/DL (ref 8.3–10.6)
CHLORIDE BLD-SCNC: 106 MMOL/L (ref 99–110)
CO2: 23 MMOL/L (ref 21–32)
CREAT SERPL-MCNC: 1.2 MG/DL (ref 0.8–1.3)
EOSINOPHILS ABSOLUTE: 0.1 K/UL (ref 0–0.6)
EOSINOPHILS RELATIVE PERCENT: 0.9 %
GFR AFRICAN AMERICAN: >60
GFR NON-AFRICAN AMERICAN: 59
GLOBULIN: 2.1 G/DL
GLUCOSE BLD-MCNC: 120 MG/DL (ref 70–99)
HCT VFR BLD CALC: 40.8 % (ref 40.5–52.5)
HEMOGLOBIN: 13.8 G/DL (ref 13.5–17.5)
LYMPHOCYTES ABSOLUTE: 1.1 K/UL (ref 1–5.1)
LYMPHOCYTES RELATIVE PERCENT: 17.1 %
MCH RBC QN AUTO: 32.2 PG (ref 26–34)
MCHC RBC AUTO-ENTMCNC: 33.8 G/DL (ref 31–36)
MCV RBC AUTO: 95.2 FL (ref 80–100)
MONOCYTES ABSOLUTE: 0.6 K/UL (ref 0–1.3)
MONOCYTES RELATIVE PERCENT: 8.8 %
NEUTROPHILS ABSOLUTE: 4.8 K/UL (ref 1.7–7.7)
NEUTROPHILS RELATIVE PERCENT: 72.4 %
PDW BLD-RTO: 13.2 % (ref 12.4–15.4)
PLATELET # BLD: 203 K/UL (ref 135–450)
PMV BLD AUTO: 7.6 FL (ref 5–10.5)
POTASSIUM REFLEX MAGNESIUM: 3.8 MMOL/L (ref 3.5–5.1)
RBC # BLD: 4.29 M/UL (ref 4.2–5.9)
SODIUM BLD-SCNC: 143 MMOL/L (ref 136–145)
TOTAL PROTEIN: 6.5 G/DL (ref 6.4–8.2)
TROPONIN: <0.01 NG/ML
WBC # BLD: 6.7 K/UL (ref 4–11)

## 2019-08-02 PROCEDURE — 93005 ELECTROCARDIOGRAM TRACING: CPT | Performed by: EMERGENCY MEDICINE

## 2019-08-02 PROCEDURE — 2580000003 HC RX 258: Performed by: INTERNAL MEDICINE

## 2019-08-02 PROCEDURE — 6370000000 HC RX 637 (ALT 250 FOR IP)

## 2019-08-02 PROCEDURE — 80053 COMPREHEN METABOLIC PANEL: CPT

## 2019-08-02 PROCEDURE — 36415 COLL VENOUS BLD VENIPUNCTURE: CPT

## 2019-08-02 PROCEDURE — G0378 HOSPITAL OBSERVATION PER HR: HCPCS

## 2019-08-02 PROCEDURE — 85025 COMPLETE CBC W/AUTO DIFF WBC: CPT

## 2019-08-02 PROCEDURE — 96360 HYDRATION IV INFUSION INIT: CPT

## 2019-08-02 PROCEDURE — 84484 ASSAY OF TROPONIN QUANT: CPT

## 2019-08-02 PROCEDURE — 71046 X-RAY EXAM CHEST 2 VIEWS: CPT

## 2019-08-02 PROCEDURE — 99285 EMERGENCY DEPT VISIT HI MDM: CPT

## 2019-08-02 RX ORDER — ASPIRIN 325 MG
162 TABLET ORAL DAILY
COMMUNITY

## 2019-08-02 RX ORDER — ASPIRIN 81 MG/1
TABLET, CHEWABLE ORAL
Status: COMPLETED
Start: 2019-08-02 | End: 2019-08-02

## 2019-08-02 RX ORDER — 0.9 % SODIUM CHLORIDE 0.9 %
1000 INTRAVENOUS SOLUTION INTRAVENOUS ONCE
Status: COMPLETED | OUTPATIENT
Start: 2019-08-02 | End: 2019-08-03

## 2019-08-02 RX ORDER — ASPIRIN 81 MG/1
324 TABLET, CHEWABLE ORAL ONCE
Status: COMPLETED | OUTPATIENT
Start: 2019-08-02 | End: 2019-08-02

## 2019-08-02 RX ADMIN — SODIUM CHLORIDE 1000 ML: 9 INJECTION, SOLUTION INTRAVENOUS at 22:37

## 2019-08-02 RX ADMIN — ASPIRIN 324 MG: 81 TABLET, CHEWABLE ORAL at 20:50

## 2019-08-02 RX ADMIN — ASPIRIN 81 MG 324 MG: 81 TABLET ORAL at 20:50

## 2019-08-02 ASSESSMENT — HEART SCORE: ECG: 0

## 2019-08-03 VITALS
DIASTOLIC BLOOD PRESSURE: 75 MMHG | WEIGHT: 150 LBS | TEMPERATURE: 96.1 F | BODY MASS INDEX: 22.22 KG/M2 | SYSTOLIC BLOOD PRESSURE: 156 MMHG | OXYGEN SATURATION: 97 % | HEART RATE: 50 BPM | RESPIRATION RATE: 16 BRPM | HEIGHT: 69 IN

## 2019-08-03 LAB
EKG ATRIAL RATE: 55 BPM
EKG DIAGNOSIS: NORMAL
EKG P AXIS: 61 DEGREES
EKG P-R INTERVAL: 140 MS
EKG Q-T INTERVAL: 402 MS
EKG QRS DURATION: 96 MS
EKG QTC CALCULATION (BAZETT): 384 MS
EKG R AXIS: 10 DEGREES
EKG T AXIS: -6 DEGREES
EKG VENTRICULAR RATE: 55 BPM
TROPONIN: <0.01 NG/ML

## 2019-08-03 PROCEDURE — 93010 ELECTROCARDIOGRAM REPORT: CPT | Performed by: INTERNAL MEDICINE

## 2019-08-03 PROCEDURE — 2580000003 HC RX 258: Performed by: INTERNAL MEDICINE

## 2019-08-03 PROCEDURE — G0378 HOSPITAL OBSERVATION PER HR: HCPCS

## 2019-08-03 PROCEDURE — 36415 COLL VENOUS BLD VENIPUNCTURE: CPT

## 2019-08-03 PROCEDURE — 84484 ASSAY OF TROPONIN QUANT: CPT

## 2019-08-03 RX ORDER — ZOLPIDEM TARTRATE 5 MG/1
10 TABLET ORAL NIGHTLY PRN
Status: DISCONTINUED | OUTPATIENT
Start: 2019-08-03 | End: 2019-08-03

## 2019-08-03 RX ORDER — SODIUM CHLORIDE 0.9 % (FLUSH) 0.9 %
10 SYRINGE (ML) INJECTION PRN
Status: DISCONTINUED | OUTPATIENT
Start: 2019-08-03 | End: 2019-08-03 | Stop reason: HOSPADM

## 2019-08-03 RX ORDER — DOCUSATE SODIUM 100 MG/1
100 CAPSULE, LIQUID FILLED ORAL DAILY
Status: DISCONTINUED | OUTPATIENT
Start: 2019-08-03 | End: 2019-08-03 | Stop reason: HOSPADM

## 2019-08-03 RX ORDER — TAMSULOSIN HYDROCHLORIDE 0.4 MG/1
0.4 CAPSULE ORAL 2 TIMES DAILY
Status: DISCONTINUED | OUTPATIENT
Start: 2019-08-03 | End: 2019-08-03 | Stop reason: HOSPADM

## 2019-08-03 RX ORDER — ZOLPIDEM TARTRATE 5 MG/1
10 TABLET ORAL ONCE
Status: DISCONTINUED | OUTPATIENT
Start: 2019-08-03 | End: 2019-08-03 | Stop reason: HOSPADM

## 2019-08-03 RX ORDER — POTASSIUM CHLORIDE 20 MEQ/1
40 TABLET, EXTENDED RELEASE ORAL PRN
Status: DISCONTINUED | OUTPATIENT
Start: 2019-08-03 | End: 2019-08-03 | Stop reason: HOSPADM

## 2019-08-03 RX ORDER — DIAZEPAM 5 MG/1
5 TABLET ORAL ONCE
Status: DISCONTINUED | OUTPATIENT
Start: 2019-08-03 | End: 2019-08-03 | Stop reason: HOSPADM

## 2019-08-03 RX ORDER — ZOLPIDEM TARTRATE 5 MG/1
5 TABLET ORAL ONCE
Status: DISCONTINUED | OUTPATIENT
Start: 2019-08-03 | End: 2019-08-03 | Stop reason: HOSPADM

## 2019-08-03 RX ORDER — MONTELUKAST SODIUM 10 MG/1
10 TABLET ORAL DAILY
Status: DISCONTINUED | OUTPATIENT
Start: 2019-08-03 | End: 2019-08-03 | Stop reason: HOSPADM

## 2019-08-03 RX ORDER — ASPIRIN 325 MG
162 TABLET ORAL DAILY
Status: DISCONTINUED | OUTPATIENT
Start: 2019-08-03 | End: 2019-08-03 | Stop reason: HOSPADM

## 2019-08-03 RX ORDER — TAMSULOSIN HYDROCHLORIDE 0.4 MG/1
0.4 CAPSULE ORAL EVERY OTHER DAY
COMMUNITY
End: 2020-02-25 | Stop reason: ALTCHOICE

## 2019-08-03 RX ORDER — DIAZEPAM 5 MG/1
5 TABLET ORAL NIGHTLY PRN
Status: DISCONTINUED | OUTPATIENT
Start: 2019-08-03 | End: 2019-08-03

## 2019-08-03 RX ORDER — MAGNESIUM SULFATE 1 G/100ML
1 INJECTION INTRAVENOUS PRN
Status: DISCONTINUED | OUTPATIENT
Start: 2019-08-03 | End: 2019-08-03 | Stop reason: HOSPADM

## 2019-08-03 RX ORDER — MORPHINE SULFATE 4 MG/ML
2 INJECTION, SOLUTION INTRAMUSCULAR; INTRAVENOUS
Status: DISCONTINUED | OUTPATIENT
Start: 2019-08-03 | End: 2019-08-03 | Stop reason: HOSPADM

## 2019-08-03 RX ORDER — ZOLPIDEM TARTRATE 5 MG/1
5 TABLET ORAL NIGHTLY PRN
Status: DISCONTINUED | OUTPATIENT
Start: 2019-08-03 | End: 2019-08-03 | Stop reason: HOSPADM

## 2019-08-03 RX ORDER — ONDANSETRON 2 MG/ML
4 INJECTION INTRAMUSCULAR; INTRAVENOUS EVERY 6 HOURS PRN
Status: DISCONTINUED | OUTPATIENT
Start: 2019-08-03 | End: 2019-08-03 | Stop reason: HOSPADM

## 2019-08-03 RX ORDER — NITROGLYCERIN 0.4 MG/1
0.4 TABLET SUBLINGUAL EVERY 5 MIN PRN
COMMUNITY

## 2019-08-03 RX ORDER — DIAZEPAM 10 MG/1
10 TABLET ORAL NIGHTLY PRN
COMMUNITY
End: 2019-10-01 | Stop reason: SDUPTHER

## 2019-08-03 RX ORDER — SODIUM CHLORIDE 9 MG/ML
INJECTION, SOLUTION INTRAVENOUS CONTINUOUS
Status: DISCONTINUED | OUTPATIENT
Start: 2019-08-03 | End: 2019-08-03 | Stop reason: HOSPADM

## 2019-08-03 RX ORDER — 0.9 % SODIUM CHLORIDE 0.9 %
1000 INTRAVENOUS SOLUTION INTRAVENOUS ONCE
Status: DISCONTINUED | OUTPATIENT
Start: 2019-08-03 | End: 2019-08-03

## 2019-08-03 RX ORDER — NITROGLYCERIN 0.4 MG/1
0.4 TABLET SUBLINGUAL EVERY 5 MIN PRN
Status: DISCONTINUED | OUTPATIENT
Start: 2019-08-03 | End: 2019-08-03 | Stop reason: HOSPADM

## 2019-08-03 RX ORDER — SODIUM CHLORIDE 0.9 % (FLUSH) 0.9 %
10 SYRINGE (ML) INJECTION EVERY 12 HOURS SCHEDULED
Status: DISCONTINUED | OUTPATIENT
Start: 2019-08-03 | End: 2019-08-03 | Stop reason: HOSPADM

## 2019-08-03 RX ORDER — POTASSIUM CHLORIDE 7.45 MG/ML
10 INJECTION INTRAVENOUS PRN
Status: DISCONTINUED | OUTPATIENT
Start: 2019-08-03 | End: 2019-08-03 | Stop reason: HOSPADM

## 2019-08-03 RX ORDER — DIAZEPAM 5 MG/1
5 TABLET ORAL NIGHTLY PRN
Status: DISCONTINUED | OUTPATIENT
Start: 2019-08-03 | End: 2019-08-03 | Stop reason: HOSPADM

## 2019-08-03 RX ORDER — CARVEDILOL 3.12 MG/1
3.12 TABLET ORAL 2 TIMES DAILY WITH MEALS
Status: DISCONTINUED | OUTPATIENT
Start: 2019-08-03 | End: 2019-08-03 | Stop reason: HOSPADM

## 2019-08-03 RX ADMIN — SODIUM CHLORIDE: 9 INJECTION, SOLUTION INTRAVENOUS at 01:30

## 2019-08-03 ASSESSMENT — ENCOUNTER SYMPTOMS
COUGH: 0
SHORTNESS OF BREATH: 0
BACK PAIN: 0
ABDOMINAL PAIN: 0
EYE DISCHARGE: 0
NAUSEA: 0
DIARRHEA: 0
VOMITING: 0
SORE THROAT: 0

## 2019-08-03 NOTE — H&P
Hospital Medicine History & Physical      PCP: Zainab Darling MD    Date of Service: Pt seen/examined on 8/3/19 and admitted on 8/3/19 to Observation      Chief Complaint   Patient presents with    Chest Pain     Severe mid sternal chest pain with dinner around 630, patient took 1 nitro and the pain in now gone.  Fatigue     patient complains of fatigue since chest pain abated        History Of Present Illness: The patient is a 70 y.o. male with PMH below, presents with chest pain, fatigue. Patient reportedly had an episode of chest pain radiating into his left jaw overnight. This spontaneously resolved. Patient reports he was doing strenuous activity this morning involving splitting wood. He states he was fine during this activity. Later in the afternoon he developed stub substernal chest pain which did not radiate which resolved with sublingual nitro. He reports he has not had to take nitro in several years. He is a patient of Dr. Danny Redd for his cardiology care. He reports that he had a negative stress test a couple of years ago. He is chest pain-free at this time. He has history of hyperlipidemia and his cardiologist has tried him on multiple agents which he has been unable to tolerate. He does have a history of stent in the remote past (2010). He last had a left heart cath in 2014, see below. He has history of chronic bradycardia and reports that he usually runs in the 50s  He was concerned when his chest pain returned at rest so he came to the [facility]. Pt is very agitated at not being allowed to take 20 mg Ambien and 10 mg Valium a night. He reports that he takes half tablets every 2 hours overnight with the totals above. He was very agitated that we could not order this way. We eventually agreed, given the late hour that we would give him Valium 5 mg and Ambien 5 mg to which he agreed. However, I was subsequently notified that he left AMA.   I tried to return to room but pt assessment and plan:    ASSESSMENT/PLAN:  1. Chest pain, pain free now, concern for ACS, start/cont home ASA. Tele, chk serial troponins. PRN SL nitro for now. Morphine for recurrent CP. Exercise Nuc stress. NS @ 100 cc/h. Cards c/s (OP of Dr. Corina Hobson). She had an episode of chest pain radiating into his left jaw overnight, this resolved on its own. He did strenuous activity this a.m. which involves splitting wood. He had no chest pain during that time. Later this afternoon he had nonradiating chest pain at rest.  He took nitro and it resolved. 2. Probable dehydration, strenuous exercise this am (splitting wood), CP resumed this afternoon. BUN:Cr ratio ~ 20:1.  IVF. 3. HLD, chk fasting lipid panel, documented intolerance to lipid lowering agents. No statin for this reason, defer to cards for therapy if desired. 4.  Insomnia, Pt reports Valium and 20 mg Zolpidem nightly. Uncomfortable with ordering this, especially the 2x maximum dose of Zolpidem. Both of these Rx are THOMAS agonist.  He also reports daily beer. Pt is very agitated at not being allowed to take 20 mg Ambien and 10 mg Valium a night. He reports that he takes half tablets every 2 hours overnight with the totals above. He was very agitated that we could not order this way. We eventually agreed, given the late hour that we would give him Valium 5 mg and Ambien 5 mg to which he agreed. However, I was subsequently notified that he left AMA. I tried to return to room but pt had already left. 5. BPH, cont BID Flomax. DVT Prophylaxis: Lovenox  Diet: Cardiac, NPO after midnight for stress. Code Status: Full Code  PT/OT Eval Status: Will order if needed and as patient condition allows  Dispo - Admit to PCU obs w/ tele. Tiffany Lopez MD    Thank you Priscilla Velázquez MD for the opportunity to be involved in this patient's care.  If you have any questions or concerns please feel free to contact me via the [employer] at

## 2019-08-03 NOTE — PROGRESS NOTES
Clinical called this RN requesting AMA papers. States that pt is trying to walk out the door. This RN calls security and goes to bedside with AMA papers. IV removed by clinical, pt getting dressed at this time. States he has called his son for ride. This RN attempts to read AMA paperwork to PT. Pt in bathroom drinking multiple cups of water, not acknowledging this RN. I continue to read and get 2 sentences into AMA paperwork, pt walks out the door and refuses to listen to [facility] paper. Pt refuses to sign and states \"I dont have to sign anything\" clinical and security in [facility] as pt is yelling. Security escorted pt down elevator at this time.  Dr. La Vital made aware of Pt leaving AMA, refusal to listen to risks, and refusal to sign AMA paperwork  Ce Mcghee

## 2019-08-03 NOTE — ED PROVIDER NOTES
(!) 114/57 113/60 (!) 121/57 (!) 156/75   Pulse: 58 54 53 50   Resp: 18 19 16 16   Temp:    96.1 °F (35.6 °C)   TempSrc:    Axillary   SpO2: 97% 98% 99% 97%   Weight:    150 lb (68 kg)   Height:    5' 9\" (1.753 m)       Patient was given the following medications:  Medications   aspirin chewable tablet 324 mg (324 mg Oral Given 8/2/19 2050)   0.9 % sodium chloride bolus (0 mLs Intravenous Stopped 8/3/19 0003)     Patient has known CAD. He is currently nearly pain free. Chest pain workup initiated. Patient given aspirin. He has not required any other medications. His HEART score is elevated and warrants admission for further testing. Case discussed with Dr. Hannah Ramos who has accepted admission. Patient and his wife are agreeable with the transfer. I spoke with Dr. Hannah Ramos. We thoroughly discussed the history, physical exam, laboratory and imaging studies, as well as, emergency department course. Based upon that discussion, we've decided to admit Mickel Fothergill. for further observation and evaluation of Sofia Quan Jr.'s chest pain. As I have deemed necessary from their history, physical, and studies, I have considered and evaluated Jose Nelsonjesus for the following diagnoses:  ACUTE CORONARY SYNDROME, PERICARDIAL TAMPONADE, PNEUMOTHORAX, PULMONARY EMBOLISM, and THORACIC DISSECTION. FINAL IMPRESSION      1.  Chest pain, unspecified type          DISPOSITION/PLAN   DISPOSITION Admitted 08/02/2019 09:34:43 PM    (Please note that portions of this note were completed with a voice recognition program.  Efforts were [facility] edit the dictations but occasionally words are mis-transcribed.)    Robe Reyes MD(electronically signed)      Robe Reyes MD  08/03/19 0085

## 2019-08-03 NOTE — DISCHARGE SUMMARY
Discharge Summary    PCP: Priscilla Velázquez MD    Date of Service: Pt seen/examined on 8/3/19 and admitted on 8/3/19 to Observation      Chief Complaint   Patient presents with    Chest Pain     Severe mid sternal chest pain with dinner around 630, patient took 1 nitro and the pain in now gone.  Fatigue     patient complains of fatigue since chest pain abated        History Of Present Illness: The patient is a 70 y.o. male with PMH below, presents with chest pain, fatigue. Patient reportedly had an episode of chest pain radiating into his left jaw overnight. This spontaneously resolved. Patient reports he was doing strenuous activity this morning involving splitting wood. He states he was fine during this activity. Later in the afternoon he developed stub substernal chest pain which did not radiate which resolved with sublingual nitro. He reports he has not had to take nitro in several years. He is a patient of Dr. Ileana Brown for his cardiology care. He reports that he had a negative stress test a couple of years ago. He is chest pain-free at this time. He has history of hyperlipidemia and his cardiologist has tried him on multiple agents which he has been unable to tolerate. He does have a history of stent in the remote past (2010). He last had a left heart cath in 2014, see below. He has history of chronic bradycardia and reports that he usually runs in the 50s  He was concerned when his chest pain returned at rest so he came to the [facility]. Pt is very agitated at not being allowed to take 20 mg Ambien and 10 mg Valium a night. He reports that he takes half tablets every 2 hours overnight with the totals above. He was very agitated that we could not order this way. We eventually agreed, given the late hour that we would give him Valium 5 mg and Ambien 5 mg to which he agreed. However, I was subsequently notified that he left AMA. I tried to return to room but pt had already left.       Past

## 2019-08-03 NOTE — ED NOTES
Awaiting bed [facility]     Fede Mcneill [facility]  08/02/19 0094

## 2019-08-05 ENCOUNTER — OFFICE VISIT (OUTPATIENT)
Dept: FAMILY MEDICINE CLINIC | Age: 79
End: 2019-08-05
Payer: MEDICARE

## 2019-08-05 VITALS
SYSTOLIC BLOOD PRESSURE: 127 MMHG | WEIGHT: 153 LBS | BODY MASS INDEX: 22.59 KG/M2 | HEART RATE: 56 BPM | TEMPERATURE: 98.9 F | DIASTOLIC BLOOD PRESSURE: 74 MMHG | OXYGEN SATURATION: 98 %

## 2019-08-05 DIAGNOSIS — R07.9 CHEST PAIN, UNSPECIFIED TYPE: Primary | ICD-10-CM

## 2019-08-05 DIAGNOSIS — I25.10 ATHEROSCLEROSIS OF NATIVE CORONARY ARTERY OF NATIVE HEART WITHOUT ANGINA PECTORIS: ICD-10-CM

## 2019-08-05 PROCEDURE — G8427 DOCREV CUR MEDS BY ELIG CLIN: HCPCS | Performed by: FAMILY MEDICINE

## 2019-08-05 PROCEDURE — 99214 OFFICE O/P EST MOD 30 MIN: CPT | Performed by: FAMILY MEDICINE

## 2019-08-05 PROCEDURE — G8420 CALC BMI NORM PARAMETERS: HCPCS | Performed by: FAMILY MEDICINE

## 2019-08-05 PROCEDURE — 1123F ACP DISCUSS/DSCN MKR DOCD: CPT | Performed by: FAMILY MEDICINE

## 2019-08-05 PROCEDURE — G8598 ASA/ANTIPLAT THER USED: HCPCS | Performed by: FAMILY MEDICINE

## 2019-08-05 PROCEDURE — 1036F TOBACCO NON-USER: CPT | Performed by: FAMILY MEDICINE

## 2019-08-05 PROCEDURE — 4040F PNEUMOC VAC/ADMIN/RCVD: CPT | Performed by: FAMILY MEDICINE

## 2019-08-05 RX ORDER — AMLODIPINE BESYLATE 2.5 MG/1
2.5 TABLET ORAL DAILY
Qty: 90 TABLET | Refills: 1 | Status: CANCELLED | OUTPATIENT
Start: 2019-08-05

## 2019-08-05 RX ORDER — PANTOPRAZOLE SODIUM 20 MG/1
20 TABLET, DELAYED RELEASE ORAL DAILY
Qty: 30 TABLET | Refills: 0 | Status: SHIPPED | OUTPATIENT
Start: 2019-08-05 | End: 2019-10-30 | Stop reason: ALTCHOICE

## 2019-08-09 DIAGNOSIS — R07.9 CHEST PAIN, UNSPECIFIED TYPE: Primary | ICD-10-CM

## 2019-08-16 ENCOUNTER — HOSPITAL ENCOUNTER (OUTPATIENT)
Dept: NON INVASIVE DIAGNOSTICS | Age: 79
Discharge: HOME | End: 2019-08-16
Payer: MEDICARE

## 2019-08-16 DIAGNOSIS — R07.9 CHEST PAIN, UNSPECIFIED TYPE: ICD-10-CM

## 2019-08-16 PROCEDURE — 93017 CV STRESS TEST TRACING ONLY: CPT

## 2019-09-30 PROBLEM — R07.9 CHEST PAIN: Status: RESOLVED | Noted: 2019-08-02 | Resolved: 2019-09-30

## 2019-10-01 ENCOUNTER — OFFICE VISIT (OUTPATIENT)
Dept: FAMILY MEDICINE CLINIC | Age: 79
End: 2019-10-01
Payer: MEDICARE

## 2019-10-01 VITALS
TEMPERATURE: 97.5 F | OXYGEN SATURATION: 99 % | SYSTOLIC BLOOD PRESSURE: 110 MMHG | WEIGHT: 153 LBS | HEART RATE: 47 BPM | BODY MASS INDEX: 22.59 KG/M2 | DIASTOLIC BLOOD PRESSURE: 74 MMHG

## 2019-10-01 DIAGNOSIS — E78.00 PURE HYPERCHOLESTEROLEMIA: ICD-10-CM

## 2019-10-01 DIAGNOSIS — R73.9 HYPERGLYCEMIA: ICD-10-CM

## 2019-10-01 DIAGNOSIS — I25.10 ATHEROSCLEROSIS OF NATIVE CORONARY ARTERY OF NATIVE HEART WITHOUT ANGINA PECTORIS: ICD-10-CM

## 2019-10-01 DIAGNOSIS — J30.9 ALLERGIC SINUSITIS: ICD-10-CM

## 2019-10-01 DIAGNOSIS — F51.04 INSOMNIA, PSYCHOPHYSIOLOGICAL: Primary | ICD-10-CM

## 2019-10-01 DIAGNOSIS — G25.81 RESTLESS LEGS SYNDROME: ICD-10-CM

## 2019-10-01 PROCEDURE — G8482 FLU IMMUNIZE ORDER/ADMIN: HCPCS | Performed by: FAMILY MEDICINE

## 2019-10-01 PROCEDURE — G8427 DOCREV CUR MEDS BY ELIG CLIN: HCPCS | Performed by: FAMILY MEDICINE

## 2019-10-01 PROCEDURE — G0008 ADMIN INFLUENZA VIRUS VAC: HCPCS | Performed by: FAMILY MEDICINE

## 2019-10-01 PROCEDURE — 4040F PNEUMOC VAC/ADMIN/RCVD: CPT | Performed by: FAMILY MEDICINE

## 2019-10-01 PROCEDURE — 99214 OFFICE O/P EST MOD 30 MIN: CPT | Performed by: FAMILY MEDICINE

## 2019-10-01 PROCEDURE — 90653 IIV ADJUVANT VACCINE IM: CPT | Performed by: FAMILY MEDICINE

## 2019-10-01 PROCEDURE — 1123F ACP DISCUSS/DSCN MKR DOCD: CPT | Performed by: FAMILY MEDICINE

## 2019-10-01 PROCEDURE — 1036F TOBACCO NON-USER: CPT | Performed by: FAMILY MEDICINE

## 2019-10-01 PROCEDURE — G8420 CALC BMI NORM PARAMETERS: HCPCS | Performed by: FAMILY MEDICINE

## 2019-10-01 PROCEDURE — G8598 ASA/ANTIPLAT THER USED: HCPCS | Performed by: FAMILY MEDICINE

## 2019-10-01 RX ORDER — MONTELUKAST SODIUM 10 MG/1
10 TABLET ORAL DAILY
Qty: 30 TABLET | Refills: 5 | Status: SHIPPED | OUTPATIENT
Start: 2019-10-01 | End: 2020-04-07 | Stop reason: SDUPTHER

## 2019-10-01 RX ORDER — DIAZEPAM 10 MG/1
10 TABLET ORAL NIGHTLY PRN
Qty: 30 TABLET | Refills: 2 | Status: SHIPPED | OUTPATIENT
Start: 2019-10-01 | End: 2020-04-07 | Stop reason: SDUPTHER

## 2019-10-01 RX ORDER — ZOLPIDEM TARTRATE 10 MG/1
TABLET ORAL
Qty: 60 TABLET | Refills: 2 | Status: SHIPPED | OUTPATIENT
Start: 2019-10-01 | End: 2020-01-13

## 2019-10-01 RX ORDER — MAGNESIUM OXIDE/MAG AA CHELATE 300 MG
400 CAPSULE ORAL
COMMUNITY

## 2019-10-03 ENCOUNTER — NURSE ONLY (OUTPATIENT)
Dept: FAMILY MEDICINE CLINIC | Age: 79
End: 2019-10-03
Payer: MEDICARE

## 2019-10-03 DIAGNOSIS — G25.81 RESTLESS LEG SYNDROME: ICD-10-CM

## 2019-10-03 DIAGNOSIS — Z12.5 SCREENING PSA (PROSTATE SPECIFIC ANTIGEN): Primary | ICD-10-CM

## 2019-10-03 DIAGNOSIS — E78.00 PURE HYPERCHOLESTEROLEMIA: ICD-10-CM

## 2019-10-03 DIAGNOSIS — I25.10 ATHEROSCLEROSIS OF NATIVE CORONARY ARTERY OF NATIVE HEART WITHOUT ANGINA PECTORIS: ICD-10-CM

## 2019-10-03 LAB
ANION GAP SERPL CALCULATED.3IONS-SCNC: 15 MMOL/L (ref 3–16)
BUN BLDV-MCNC: 14 MG/DL (ref 7–20)
CALCIUM SERPL-MCNC: 9.1 MG/DL (ref 8.3–10.6)
CHLORIDE BLD-SCNC: 101 MMOL/L (ref 99–110)
CHOLESTEROL, TOTAL: 177 MG/DL (ref 0–199)
CO2: 25 MMOL/L (ref 21–32)
CREAT SERPL-MCNC: 1 MG/DL (ref 0.8–1.3)
GFR AFRICAN AMERICAN: >60
GFR NON-AFRICAN AMERICAN: >60
GLUCOSE BLD-MCNC: 99 MG/DL (ref 70–99)
HDLC SERPL-MCNC: 68 MG/DL (ref 40–60)
LDL CHOLESTEROL CALCULATED: 91 MG/DL
MAGNESIUM: 2.3 MG/DL (ref 1.8–2.4)
POTASSIUM SERPL-SCNC: 4.1 MMOL/L (ref 3.5–5.1)
PROSTATE SPECIFIC ANTIGEN: 1.94 NG/ML (ref 0–4)
SODIUM BLD-SCNC: 141 MMOL/L (ref 136–145)
TRIGL SERPL-MCNC: 88 MG/DL (ref 0–150)
VLDLC SERPL CALC-MCNC: 18 MG/DL

## 2019-10-03 PROCEDURE — 36415 COLL VENOUS BLD VENIPUNCTURE: CPT | Performed by: FAMILY MEDICINE

## 2019-10-30 ENCOUNTER — OFFICE VISIT (OUTPATIENT)
Dept: CARDIOLOGY CLINIC | Age: 79
End: 2019-10-30
Payer: MEDICARE

## 2019-10-30 VITALS
WEIGHT: 156.6 LBS | HEART RATE: 53 BPM | DIASTOLIC BLOOD PRESSURE: 60 MMHG | HEIGHT: 69 IN | OXYGEN SATURATION: 97 % | BODY MASS INDEX: 23.19 KG/M2 | SYSTOLIC BLOOD PRESSURE: 100 MMHG

## 2019-10-30 DIAGNOSIS — E78.00 PURE HYPERCHOLESTEROLEMIA: ICD-10-CM

## 2019-10-30 DIAGNOSIS — I25.10 ATHEROSCLEROSIS OF NATIVE CORONARY ARTERY OF NATIVE HEART WITHOUT ANGINA PECTORIS: Primary | ICD-10-CM

## 2019-10-30 PROCEDURE — 1036F TOBACCO NON-USER: CPT | Performed by: INTERNAL MEDICINE

## 2019-10-30 PROCEDURE — G8420 CALC BMI NORM PARAMETERS: HCPCS | Performed by: INTERNAL MEDICINE

## 2019-10-30 PROCEDURE — 99214 OFFICE O/P EST MOD 30 MIN: CPT | Performed by: INTERNAL MEDICINE

## 2019-10-30 PROCEDURE — G8482 FLU IMMUNIZE ORDER/ADMIN: HCPCS | Performed by: INTERNAL MEDICINE

## 2019-10-30 PROCEDURE — 1123F ACP DISCUSS/DSCN MKR DOCD: CPT | Performed by: INTERNAL MEDICINE

## 2019-10-30 PROCEDURE — 4040F PNEUMOC VAC/ADMIN/RCVD: CPT | Performed by: INTERNAL MEDICINE

## 2019-10-30 PROCEDURE — G8598 ASA/ANTIPLAT THER USED: HCPCS | Performed by: INTERNAL MEDICINE

## 2019-10-30 PROCEDURE — G8427 DOCREV CUR MEDS BY ELIG CLIN: HCPCS | Performed by: INTERNAL MEDICINE

## 2020-02-03 ENCOUNTER — HOSPITAL ENCOUNTER (OUTPATIENT)
Age: 80
Discharge: HOME | End: 2020-02-03
Payer: MEDICARE

## 2020-02-03 ENCOUNTER — HOSPITAL ENCOUNTER (OUTPATIENT)
Dept: GENERAL RADIOLOGY | Age: 80
Discharge: HOME | End: 2020-02-03
Payer: MEDICARE

## 2020-02-03 PROCEDURE — 74018 RADEX ABDOMEN 1 VIEW: CPT

## 2020-02-25 ENCOUNTER — TELEPHONE (OUTPATIENT)
Dept: CARDIOLOGY CLINIC | Age: 80
End: 2020-02-25

## 2020-02-25 ENCOUNTER — OFFICE VISIT (OUTPATIENT)
Dept: FAMILY MEDICINE CLINIC | Age: 80
End: 2020-02-25
Payer: MEDICARE

## 2020-02-25 VITALS
TEMPERATURE: 97.3 F | SYSTOLIC BLOOD PRESSURE: 134 MMHG | OXYGEN SATURATION: 99 % | DIASTOLIC BLOOD PRESSURE: 74 MMHG | WEIGHT: 160.25 LBS | BODY MASS INDEX: 23.66 KG/M2 | HEART RATE: 47 BPM

## 2020-02-25 PROCEDURE — 4040F PNEUMOC VAC/ADMIN/RCVD: CPT | Performed by: FAMILY MEDICINE

## 2020-02-25 PROCEDURE — 99213 OFFICE O/P EST LOW 20 MIN: CPT | Performed by: FAMILY MEDICINE

## 2020-02-25 PROCEDURE — G8482 FLU IMMUNIZE ORDER/ADMIN: HCPCS | Performed by: FAMILY MEDICINE

## 2020-02-25 PROCEDURE — 1036F TOBACCO NON-USER: CPT | Performed by: FAMILY MEDICINE

## 2020-02-25 PROCEDURE — G8420 CALC BMI NORM PARAMETERS: HCPCS | Performed by: FAMILY MEDICINE

## 2020-02-25 PROCEDURE — 93000 ELECTROCARDIOGRAM COMPLETE: CPT | Performed by: FAMILY MEDICINE

## 2020-02-25 PROCEDURE — 1123F ACP DISCUSS/DSCN MKR DOCD: CPT | Performed by: FAMILY MEDICINE

## 2020-02-25 PROCEDURE — G8427 DOCREV CUR MEDS BY ELIG CLIN: HCPCS | Performed by: FAMILY MEDICINE

## 2020-02-25 NOTE — PROGRESS NOTES
Subjective:   He presents for episode of chest pain yesterday. He states he was walking up a steep hill and developed heavy pressure in the center of his chest.  He states it was cold out and he was talking. He stopped and he rest and the pain completely went away and he has had no pain since then. He does have nitroglycerin tablets but he did not have any with him and has not used any lately. He saw his cardiologist back in the fall and he states everything checked out okay. He thinks he had a stress test about 6 to 8 months ago which they told him was normal.  He had angioplasty done in 2010 and had one stent placed. He states he thought he should get checked out so made appointment for today. He has no current complaints. He states his blood pressures are usually in the 120 range but recently systolics have been in the 130 range. He is on daily aspirin but he is not on any antihypertensive medications and he does not tolerate cholesterol medications/statins. He is allergic to anectine [succinylcholine]; lovastatin; pravastatin; simvastatin; and tylenol [acetaminophen]. Objective:   /74   Pulse (!) 47   Temp 97.3 °F (36.3 °C) (Oral)   Wt 160 lb 4 oz (72.7 kg)   SpO2 99%   BMI 23.66 kg/m²   No results found for this visit on 02/25/20. Exam: Constitutional:  Well developed, well nourished, no acute distress, non-toxic appearance    HENT:  Atraumatic,oropharynx moist,  Neck-  no tenderness, supple   Respiratory:  No respiratory distress, normal breath sounds, no rales, no wheezing   Cardiovascular:  Normal rate, normal rhythm, no murmurs, no gallops, no rubs   GI:  Soft, nondistended, nontender, no organomegaly, no mass, no rebound, no guarding   Musculoskeletal:  No edema  Integument:  Well hydrated, no rash   Neurologic:  Alert & oriented x 3, no focal deficits noted   Psychiatric:  Speech and behavior appropriate, normal affect and mood  Assessment and Plan:   Diagnosis Orders   1.  Stable

## 2020-03-10 NOTE — TELEPHONE ENCOUNTER
I called pt back he was very nasty with me upset that when we call our number does not show up as a full phone number on his caller ID I explained to him that our back lines are not forwarded to main number and if pt call after hours and it emergency noone would answer.   I advised him we could see 11th he refused has prior appt then advised we would add on 18th at [phone] and he agreed told him to call if CP is worrisome he reported he did not have any chest pain Odessa Perez, RN

## 2020-03-10 NOTE — TELEPHONE ENCOUNTER
OK  Call him if chest pain becomes more frequent alisha without provocation  Call me back and will make arrangements to see him at [place] office sooner.

## 2020-03-18 ENCOUNTER — TELEPHONE (OUTPATIENT)
Dept: CARDIOLOGY CLINIC | Age: 80
End: 2020-03-18

## 2020-03-18 ENCOUNTER — OFFICE VISIT (OUTPATIENT)
Dept: CARDIOLOGY CLINIC | Age: 80
End: 2020-03-18
Payer: MEDICARE

## 2020-03-18 VITALS
DIASTOLIC BLOOD PRESSURE: 60 MMHG | HEIGHT: 69 IN | TEMPERATURE: 97.7 F | BODY MASS INDEX: 23.52 KG/M2 | OXYGEN SATURATION: 99 % | WEIGHT: 158.8 LBS | HEART RATE: 50 BPM | SYSTOLIC BLOOD PRESSURE: 108 MMHG

## 2020-03-18 PROBLEM — R07.89 OTHER CHEST PAIN: Status: ACTIVE | Noted: 2019-08-02

## 2020-03-18 PROCEDURE — 4040F PNEUMOC VAC/ADMIN/RCVD: CPT | Performed by: INTERNAL MEDICINE

## 2020-03-18 PROCEDURE — G8482 FLU IMMUNIZE ORDER/ADMIN: HCPCS | Performed by: INTERNAL MEDICINE

## 2020-03-18 PROCEDURE — 1036F TOBACCO NON-USER: CPT | Performed by: INTERNAL MEDICINE

## 2020-03-18 PROCEDURE — 99214 OFFICE O/P EST MOD 30 MIN: CPT | Performed by: INTERNAL MEDICINE

## 2020-03-18 PROCEDURE — G8420 CALC BMI NORM PARAMETERS: HCPCS | Performed by: INTERNAL MEDICINE

## 2020-03-18 PROCEDURE — G8427 DOCREV CUR MEDS BY ELIG CLIN: HCPCS | Performed by: INTERNAL MEDICINE

## 2020-03-18 PROCEDURE — 1123F ACP DISCUSS/DSCN MKR DOCD: CPT | Performed by: INTERNAL MEDICINE

## 2020-03-18 NOTE — LETTER
[id number]  [facility] [address] [address] [address]  Phone: [phone]  Fax: [phone]     Roni Rao MD     3/27/2020     Danielle Rosado, [address] [address]     Patient: Pearl Quezada   MR Number: [id number]   YOB: 1940   Date of Visit: 3/18/2020     Dear Dr. Danielle Rosado     Today I saw our mutual patient named above. Below are the relevant portions of my assessment and plan of care. If you have questions, please do not hesitate to call me. I look forward to following [facility] along with you. [address] Office Note  3/18/2020     Subjective:  Mr. Margie Knox is here for cardiac follow up of his CAD and hyperlipidemia. HPI:  Today he reports having a lot of indigestion and burping. He did have episodes of CP with tingling in jaw and episode of cp while outside walking uphill. Chest pain resolved with rest. Has had no CP for a few weeks but has refrained from strenuous activity. Denies, shortness of breath, edema, dizziness, palpitations and syncope. PMH:  Patient previously seen Dr. Isela Saldivar . He underwent cardiac cath in 2010 with stent placement to the LAD. He has been intolerant to statins and has been managed on Welchol. He reports to having myalgias, weakness, headaches and anuria with statin therapy. Welchol was then stopped. He is monitoring just diet and doing well. LDL 91 10.3. 19. Came to hospital 11/26/14 Patient reported moderate chest pain, radiation to his jaw and arm with associated SOB, nausea, diaphoresis. He reportedly took an 81 mg aspirin at home as well as 2 nitroglycerin. EMS squad arrived to find patient lightheaded with systolic blood pressure in the 80s. Blood pressure stabilized throughout transport to the emergency department. Upon arrival, patient denies any pain.  Cardiologist Dr. Kitty Campo seen and examined and had patient  transferred to [facility] where he underwent  THYROIDECTOMY, PARTIAL  04    TONSILLECTOMY         Objective:   /60   Pulse 50   Temp 97.7 °F (36.5 °C)   Ht 5' 9\" (1.753 m)   Wt 158 lb 12.8 oz (72 kg)   SpO2 99%   BMI 23.45 kg/m²      Wt Readings from Last 3 Encounters:   03/18/20 158 lb 12.8 oz (72 kg)   02/25/20 160 lb 4 oz (72.7 kg)   10/30/19 156 lb 9.6 oz (71 kg)       Physical Exam:  General: No Respiratory distress, appears well developed and well nourished. Eyes:  Sclera nonicteric  Nose/Sinuses:  negative findings: nose shows no deformity, asymmetry, or inflammation, nasal mucosa normal, septum midline with no perforation or bleeding  Back:  no pain to palpation  Joint:  no active joint inflammation  Musculoskeletal:  negative  Skin:  Warm and dry  Neck:  Negative for JVD and Carotid Bruits. Chest:  Clear to auscultation, respiration easy  Cardiovascular: RRR  S2 normal, no murmur, no rub or thrill. Extremities:   No edema, No clubbing, cyanosis,  Pulses: Radials and pedal  pulses are normal.  Neuro: intact    Medications:   Outpatient Encounter Medications as of 3/18/2020   Medication Sig Dispense Refill    Magnesium 300 MG CAPS Take 400 mg by mouth       hydrocortisone (PROCTO-MED HC) 2.5 % rectal cream PLACE RECTALLY TWICE DAILY 30 g 0    montelukast (SINGULAIR) 10 MG tablet Take 1 tablet by mouth daily 30 tablet 5    nitroGLYCERIN (NITROSTAT) 0.4 MG SL tablet Place 0.4 mg under the tongue every 5 minutes as needed for Chest pain up to max of 3 total doses. If no relief after 1 dose, call 911.       aspirin 325 MG tablet Take 162 mg by mouth daily      ECHINACEA PO Take by mouth      docusate sodium (COLACE) 100 MG capsule Take 100 mg by mouth daily      Ascorbic Acid (VITAMIN C) 500 MG tablet Take 500 mg by mouth daily Takes 4 tabs daily      Cholecalciferol (VITAMIN D3) 1000 UNITS CAPS Take 4,000 Units by mouth daily (Patient taking differently: Take 2,000 Units by mouth daily ) 1 capsule 0

## 2020-03-18 NOTE — PROGRESS NOTES
[facility] Office Note  3/18/2020     Subjective:  Mr. Memory Crigler is here for cardiac follow up of his CAD and hyperlipidemia. HPI:  Today he reports having a lot of indigestion and burping. He did have episodes of CP with tingling in jaw and episode of cp while outside walking uphill. Chest pain resolved with rest. Has had no CP for a few weeks but has refrained from strenuous activity. Denies, shortness of breath, edema, dizziness, palpitations and syncope. PMH:  Patient previously seen Dr. Angélica Skinner . He underwent cardiac cath in 2010 with stent placement to the LAD. He has been intolerant to statins and has been managed on Welchol. He reports to having myalgias, weakness, headaches and anuria with statin therapy. Welchol was then stopped. He is monitoring just diet and doing well. LDL 91 10.3. 19. Came to hospital 11/26/14 Patient reported moderate chest pain, radiation to his jaw and arm with associated SOB, nausea, diaphoresis. He reportedly took an 81 mg aspirin at home as well as 2 nitroglycerin. EMS squad arrived to find patient lightheaded with systolic blood pressure in the 80s. Blood pressure stabilized throughout transport to the emergency department. Upon arrival, patient denies any pain. Cardiologist Dr. Geneva Olson seen and examined and had patient  transferred to [facility] where he underwent LHC 11/26/14 which revealed LM: normals LAD: previous proximal stent patent with 30-40% instent restenosis at distal stent edge. LCX: DOMINANT, luminals. Om1 with mid 20%, distal LCX/PDA with 10-20% RCA: small nonodominantLVEDP 7 LVEF 65%   He is statin intolerant  He is no longer taking welchol as it was causing muscle aches and monitors cholesterol thru Dr Carol Mayfield.    He came to ED 8/2/19 with complaints of aching chest pain relieved by NItro he ruled out for MI he left AMA and subsequently underwent plain GXT which was normal       Review of Systems:  12 point ROS negative in all areas as listed below pain to palpation  Joint:  no active joint inflammation  Musculoskeletal:  negative  Skin:  Warm and dry  Neck:  Negative for JVD and Carotid Bruits. Chest:  Clear to auscultation, respiration easy  Cardiovascular: RRR  S2 normal, no murmur, no rub or thrill. Extremities:   No edema, No clubbing, cyanosis,  Pulses: Radials and pedal  pulses are normal.  Neuro: intact    Medications:   Outpatient Encounter Medications as of 3/18/2020   Medication Sig Dispense Refill    Magnesium 300 MG CAPS Take 400 mg by mouth       hydrocortisone (PROCTO-MED HC) 2.5 % rectal cream PLACE RECTALLY TWICE DAILY 30 g 0    montelukast (SINGULAIR) 10 MG tablet Take 1 tablet by mouth daily 30 tablet 5    nitroGLYCERIN (NITROSTAT) 0.4 MG SL tablet Place 0.4 mg under the tongue every 5 minutes as needed for Chest pain up to max of 3 total doses. If no relief after 1 dose, call 911.  aspirin 325 MG tablet Take 162 mg by mouth daily      ECHINACEA PO Take by mouth      docusate sodium (COLACE) 100 MG capsule Take 100 mg by mouth daily      Ascorbic Acid (VITAMIN C) 500 MG tablet Take 500 mg by mouth daily Takes 4 tabs daily      Cholecalciferol (VITAMIN D3) 1000 UNITS CAPS Take 4,000 Units by mouth daily (Patient taking differently: Take 2,000 Units by mouth daily ) 1 capsule 0    zolpidem (AMBIEN) 10 MG tablet TAKE 1/2 TAB (5 MG) EVERY 2 HOURS FOR SLEEP X 4 DOSES 60 tablet 2    diazepam (VALIUM) 10 MG tablet Take 1 tablet by mouth nightly as needed (restless legs) for up to 90 days. 30 tablet 2    Spacer/Aero-Holding Chambers (POCKET SPACER) MIGUEL Use with inhaler 1 Device 1    [DISCONTINUED] COD LIVER OIL PO Take 2,000 mg by mouth       No facility-administered encounter medications on file as of 3/18/2020. Lab Data:  CBC: No results for input(s): WBC, HGB, HCT, MCV, PLT in the last 72 hours. BMP: No results for input(s): NA, K, CL, CO2, PHOS, BUN, CREATININE in the last 72 hours.     Invalid input(s): CA  LIVER PROFILE: No results for input(s): AST, ALT, LIPASE, BILIDIR, BILITOT, ALKPHOS in the last 72 hours. Invalid input(s): AMYLASE,  ALB  LIPID:   No components found for: CHLPL  Lab Results   Component Value Date    TRIG 88 10/03/2019    TRIG 83 03/11/2019    TRIG 77 10/02/2018     Lab Results   Component Value Date    HDL 68 (H) 10/03/2019    HDL 53 03/11/2019    HDL 52 10/02/2018     Lab Results   Component Value Date    LDLCALC 91 10/03/2019    LDLCALC 102 (H) 03/11/2019    LDLCALC 97 10/02/2018     Lab Results   Component Value Date    LABVLDL 18 10/03/2019    LABVLDL 17 03/11/2019    LABVLDL 15 10/02/2018     PT/INR: No results for input(s): PROTIME, INR in the last 72 hours. A1C:   No results found for: LABA1C  BNP:  No results for input(s): BNP in the last 72 hours. Last lipids   6/10/15Cholesterol, Total 166 Triglycerides 92 HDL 59  LDL Calculated 89    8/5/14  Cholesterol, Total 161   Triglycerides 103   HDL 53    LDL Calculated 87    IMAGING:   EKG 2/25/20   Marked sinus  Bradycardia  - occasional PAC     # PACs = 1. Poor SNR (< 1) in leads  AVL   BORDERLINE RHYTHM    GXT stress test 8/16/19  Baseline resting EKG is normal.   Patient exercised on treadmill according to standard Jayjay protocol for 5 minutes and achieved 88% of predicted maximum heart rate. Normal BP response to stress. Occasional PVC during exercise. There was stress induced shortness of breath. There was stress induced fatigue. Symptoms resolved with rest.   No EKG changes of stress induced left ventricular ischemia. Ruiz treadmill score 5 indicates low risk. EKG 8/2/19  Sinus bradycardiaOtherwise normal ECGConfirmed by Bryon Ovalle MD, Paola Finney ([id number]) on 8/3/2019 9:45:21 AM     CXR 8/2/19  FINDINGS: There is moderate gaseous distension of the stomach. No acute bony abnormality. The heart size and mediastinal contours are within normal limits, stable.   The lungs are clear     Carotid US 10/18/16  The right internal carotid artery demonstrates 0-50% stenosis . The left internal carotid artery demonstrates 0-50% stenosis . Bilateral vertebral arteries are patent with flow in the normal direction. EKG 11/26/14  Sinus bradycardia with marked sinus arrhythmiaNo previous ECGs availableConfirmed by AMY NICHOLSON MD ([id number]) on 11/26/2014   CATH 11/26/14  Findings:     LM: normals  LAD: previous proximal stent patent with 30-40% instent restenosis at distal stent edge. LCX: DOMINANT, luminals. Om1 with mid 20%, distal LCX/PDA with 10-20%  RCA: small nonodominant    LVEDP 7  LVEF 65%  Plan:  1. Patent LAD stent  2. Nonobstructive CAD    Assessment:  Cynthia Herrmann was seen today for coronary artery disease, hyperlipidemia and check-up. Unstable angina pectoris  Diagnoses and associated orders for this visit:    CAD (coronary artery disease): sp stent to the LAD in 2010. Hyperlipidemia: intolerant to statin therapy. Remains on fish oil  Last lipids 10/3/19  Results reviewed in epic  Statin intolerance      Plan:  1. Medications reviewed. 2. Based on these findings I recommend left heart cath for definitive evaluation of coronary arteries. Risks, benefits, expectations, and alternative treatments were discussed. Questions appropriately answered. Berto Mari agrees to proceed and verbalized understanding. This note was scribed in the presence of Jose Hylton MD by Sylvester Dominguez RN    QUALITY MEASURES  1. Tobacco Cessation Counseling: NA  2. Retake of BP if >140/90:   NA  3. Documentation to PCP/referring for new patient:  Sent to PCP at close of office visit  4. CAD patient on anti-platelet: Yes  5. CAD patient on STATIN therapy: Not unable to tolerate statin  6. Patient with CHF and aFib on anticoagulation:  NA     I, Dr. Ce Mcgowan, personally performed the services described in this documentation, as scribed by the above signed scribe in my presence. It is both accurate and complete to my knowledge.  I agree with the details independently gathered by the clinical support staff, while the remaining scribed note accurately describes my personal service to the patient.           Gillian Pace MD 3/18/2020 12:44 PM

## 2020-03-18 NOTE — LETTER
[id number]  [facility] [address] [place] [address] Drive [address]  Phone: [phone]  Fax: [phone]     Toro Deras MD     3/27/2020     Shadia Foss, [address] [address]     Patient: Jhoana Clarke   MR Number: [id number]   YOB: 1940   Date of Visit: 3/18/2020     Dear Dr. Shadia Foss     Today I saw our mutual patient named above. Below are the relevant portions of my assessment and plan of care. If you have questions, please do not hesitate to call me. I look forward to following [place] [facility] along with you. [address] Office Note  3/18/2020     Subjective:  Mr. José Miguel Fleming is here for cardiac follow up of his CAD and hyperlipidemia. HPI:  Today he reports having a lot of indigestion and burping. He did have episodes of CP with tingling in jaw and episode of cp while outside walking uphill. Chest pain resolved with rest. Has had no CP for a few weeks but has refrained from strenuous activity. Denies, shortness of breath, edema, dizziness, palpitations and syncope. PMH:  Patient previously seen Dr. Augie Manzo . He underwent cardiac cath in 2010 with stent placement to the LAD. He has been intolerant to statins and has been managed on Welchol. He reports to having myalgias, weakness, headaches and anuria with statin therapy. Welchol was then stopped. He is monitoring just diet and doing well. LDL 91 10.3. 19. Came to hospital 11/26/14 Patient reported moderate chest pain, radiation to his jaw and arm with associated SOB, nausea, diaphoresis. He reportedly took an 81 mg aspirin at home as well as 2 nitroglycerin. EMS squad arrived to find patient lightheaded with systolic blood pressure in the 80s. Blood pressure stabilized throughout transport to the emergency department. Upon arrival, patient denies any pain.  Cardiologist Dr. Rayshawn Og seen and examined and had patient  transferred to [facility] where he underwent  THYROIDECTOMY, PARTIAL  04    TONSILLECTOMY         Objective:   /60   Pulse 50   Temp 97.7 °F (36.5 °C)   Ht 5' 9\" (1.753 m)   Wt 158 lb 12.8 oz (72 kg)   SpO2 99%   BMI 23.45 kg/m²      Wt Readings from Last 3 Encounters:   03/18/20 158 lb 12.8 oz (72 kg)   02/25/20 160 lb 4 oz (72.7 kg)   10/30/19 156 lb 9.6 oz (71 kg)       Physical Exam:  General: No Respiratory distress, appears well developed and well nourished. Eyes:  Sclera nonicteric  Nose/Sinuses:  negative findings: nose shows no deformity, asymmetry, or inflammation, nasal mucosa normal, septum midline with no perforation or bleeding  Back:  no pain to palpation  Joint:  no active joint inflammation  Musculoskeletal:  negative  Skin:  Warm and dry  Neck:  Negative for JVD and Carotid Bruits. Chest:  Clear to auscultation, respiration easy  Cardiovascular: RRR  S2 normal, no murmur, no rub or thrill. Extremities:   No edema, No clubbing, cyanosis,  Pulses: Radials and pedal  pulses are normal.  Neuro: intact    Medications:   Outpatient Encounter Medications as of 3/18/2020   Medication Sig Dispense Refill    Magnesium 300 MG CAPS Take 400 mg by mouth       hydrocortisone (PROCTO-MED HC) 2.5 % rectal cream PLACE RECTALLY TWICE DAILY 30 g 0    montelukast (SINGULAIR) 10 MG tablet Take 1 tablet by mouth daily 30 tablet 5    nitroGLYCERIN (NITROSTAT) 0.4 MG SL tablet Place 0.4 mg under the tongue every 5 minutes as needed for Chest pain up to max of 3 total doses. If no relief after 1 dose, call 911.       aspirin 325 MG tablet Take 162 mg by mouth daily      ECHINACEA PO Take by mouth      docusate sodium (COLACE) 100 MG capsule Take 100 mg by mouth daily      Ascorbic Acid (VITAMIN C) 500 MG tablet Take 500 mg by mouth daily Takes 4 tabs daily      Cholecalciferol (VITAMIN D3) 1000 UNITS CAPS Take 4,000 Units by mouth daily (Patient taking differently: Take 2,000 Units by mouth daily ) 1 capsule 0 Normal BP response to stress. Occasional PVC during exercise. There was stress induced shortness of breath. There was stress induced fatigue. Symptoms resolved with rest.   No EKG changes of stress induced left ventricular ischemia. Ruiz treadmill score 5 indicates low risk. EKG 8/2/19  Sinus bradycardiaOtherwise normal ECGConfirmed by Blanca Eli MD, Tita Ponce ([id number]) on 8/3/2019 9:45:21 AM     CXR 8/2/19  FINDINGS: There is moderate gaseous distension of the stomach. No acute bony abnormality. The heart size and mediastinal contours are within normal limits, stable. The lungs are clear     Carotid US 10/18/16  The right internal carotid artery demonstrates 0-50% stenosis . The left internal carotid artery demonstrates 0-50% stenosis . Bilateral vertebral arteries are patent with flow in the normal direction. EKG 11/26/14  Sinus bradycardia with marked sinus arrhythmiaNo previous ECGs availableConfirmed by AMY NICHOLSON MD ([id number]) on 11/26/2014   CATH 11/26/14  Findings:     LM: normals  LAD: previous proximal stent patent with 30-40% instent restenosis at distal stent edge. LCX: DOMINANT, luminals. Om1 with mid 20%, distal LCX/PDA with 10-20%  RCA: small nonodominant    LVEDP 7  LVEF 65%  Plan:  1. Patent LAD stent  2. Nonobstructive CAD    Assessment:  Rowena Salcedo was seen today for coronary artery disease, hyperlipidemia and check-up. Unstable angina pectoris  Diagnoses and associated orders for this visit:    CAD (coronary artery disease): sp stent to the LAD in 2010. Hyperlipidemia: intolerant to statin therapy. Remains on fish oil  Last lipids 10/3/19  Results reviewed in epic  Statin intolerance      Plan:  1. Medications reviewed. 2. Based on these findings I recommend left heart cath for definitive evaluation of coronary arteries. Risks, benefits, expectations, and alternative treatments were discussed. Questions appropriately answered.

## 2020-03-19 ENCOUNTER — HOSPITAL ENCOUNTER (OUTPATIENT)
Dept: CARDIAC CATH/INVASIVE PROCEDURES | Age: 80
Discharge: HOME | End: 2020-03-19
Attending: INTERNAL MEDICINE | Admitting: INTERNAL MEDICINE
Payer: MEDICARE

## 2020-03-19 VITALS — BODY MASS INDEX: 22.96 KG/M2 | HEIGHT: 69 IN | WEIGHT: 155 LBS

## 2020-03-19 LAB
A/G RATIO: 1.8 (ref 1.1–2.2)
ALBUMIN SERPL-MCNC: 4.4 G/DL (ref 3.4–5)
ALP BLD-CCNC: 52 U/L (ref 40–129)
ALT SERPL-CCNC: 13 U/L (ref 10–40)
ANION GAP SERPL CALCULATED.3IONS-SCNC: 13 MMOL/L (ref 3–16)
AST SERPL-CCNC: 23 U/L (ref 15–37)
BILIRUB SERPL-MCNC: 0.7 MG/DL (ref 0–1)
BUN BLDV-MCNC: 16 MG/DL (ref 7–20)
CALCIUM SERPL-MCNC: 9.1 MG/DL (ref 8.3–10.6)
CHLORIDE BLD-SCNC: 105 MMOL/L (ref 99–110)
CHOLESTEROL, FASTING: 184 MG/DL (ref 0–199)
CO2: 21 MMOL/L (ref 21–32)
CREAT SERPL-MCNC: 0.8 MG/DL (ref 0.8–1.3)
EKG ATRIAL RATE: 53 BPM
EKG DIAGNOSIS: NORMAL
EKG P AXIS: 81 DEGREES
EKG P-R INTERVAL: 140 MS
EKG Q-T INTERVAL: 404 MS
EKG QRS DURATION: 86 MS
EKG QTC CALCULATION (BAZETT): 379 MS
EKG R AXIS: 62 DEGREES
EKG T AXIS: 53 DEGREES
EKG VENTRICULAR RATE: 53 BPM
GFR AFRICAN AMERICAN: >60
GFR NON-AFRICAN AMERICAN: >60
GLOBULIN: 2.5 G/DL
GLUCOSE BLD-MCNC: 103 MG/DL (ref 70–99)
HCT VFR BLD CALC: 41.4 % (ref 40.5–52.5)
HDLC SERPL-MCNC: 52 MG/DL (ref 40–60)
HEMOGLOBIN: 14.5 G/DL (ref 13.5–17.5)
INR BLD: 0.99 (ref 0.86–1.14)
LDL CHOLESTEROL CALCULATED: 119 MG/DL
LEFT VENTRICULAR EJECTION FRACTION HIGH VALUE: 60 %
LEFT VENTRICULAR EJECTION FRACTION MODE: NORMAL
LV EF: 55 %
MCH RBC QN AUTO: 32.6 PG (ref 26–34)
MCHC RBC AUTO-ENTMCNC: 35 G/DL (ref 31–36)
MCV RBC AUTO: 93.4 FL (ref 80–100)
PDW BLD-RTO: 13 % (ref 12.4–15.4)
PLATELET # BLD: 205 K/UL (ref 135–450)
PMV BLD AUTO: 7.7 FL (ref 5–10.5)
POTASSIUM SERPL-SCNC: 4.7 MMOL/L (ref 3.5–5.1)
PROTHROMBIN TIME: 11.5 SEC (ref 10–13.2)
RBC # BLD: 4.44 M/UL (ref 4.2–5.9)
SODIUM BLD-SCNC: 139 MMOL/L (ref 136–145)
TOTAL PROTEIN: 6.9 G/DL (ref 6.4–8.2)
TRIGLYCERIDE, FASTING: 67 MG/DL (ref 0–150)
VLDLC SERPL CALC-MCNC: 13 MG/DL
WBC # BLD: 4.6 K/UL (ref 4–11)

## 2020-03-19 PROCEDURE — 93005 ELECTROCARDIOGRAM TRACING: CPT | Performed by: INTERNAL MEDICINE

## 2020-03-19 PROCEDURE — C1769 GUIDE WIRE: HCPCS

## 2020-03-19 PROCEDURE — 80053 COMPREHEN METABOLIC PANEL: CPT

## 2020-03-19 PROCEDURE — 6370000000 HC RX 637 (ALT 250 FOR IP)

## 2020-03-19 PROCEDURE — 85610 PROTHROMBIN TIME: CPT

## 2020-03-19 PROCEDURE — C1894 INTRO/SHEATH, NON-LASER: HCPCS

## 2020-03-19 PROCEDURE — 2580000003 HC RX 258

## 2020-03-19 PROCEDURE — 85027 COMPLETE CBC AUTOMATED: CPT

## 2020-03-19 PROCEDURE — 80061 LIPID PANEL: CPT

## 2020-03-19 PROCEDURE — 99153 MOD SED SAME PHYS/QHP EA: CPT

## 2020-03-19 PROCEDURE — 93458 L HRT ARTERY/VENTRICLE ANGIO: CPT

## 2020-03-19 PROCEDURE — 93458 L HRT ARTERY/VENTRICLE ANGIO: CPT | Performed by: INTERNAL MEDICINE

## 2020-03-19 PROCEDURE — 2500000003 HC RX 250 WO HCPCS

## 2020-03-19 PROCEDURE — 6360000004 HC RX CONTRAST MEDICATION

## 2020-03-19 PROCEDURE — 99152 MOD SED SAME PHYS/QHP 5/>YRS: CPT

## 2020-03-19 PROCEDURE — 6360000002 HC RX W HCPCS

## 2020-03-19 PROCEDURE — 2709999900 HC NON-CHARGEABLE SUPPLY

## 2020-03-19 RX ORDER — MIDAZOLAM HYDROCHLORIDE 1 MG/ML
INJECTION INTRAMUSCULAR; INTRAVENOUS
Status: COMPLETED | OUTPATIENT
Start: 2020-03-19 | End: 2020-03-19

## 2020-03-19 RX ORDER — FENTANYL CITRATE 50 UG/ML
INJECTION, SOLUTION INTRAMUSCULAR; INTRAVENOUS
Status: COMPLETED | OUTPATIENT
Start: 2020-03-19 | End: 2020-03-19

## 2020-03-19 RX ORDER — ARGININE HCL 1000 MG
1000 TABLET ORAL 2 TIMES DAILY
Qty: 30 TABLET | Refills: 3 | Status: SHIPPED | OUTPATIENT
Start: 2020-03-19 | End: 2022-04-11

## 2020-03-19 RX ORDER — ASPIRIN 81 MG/1
162 TABLET, CHEWABLE ORAL ONCE
Status: COMPLETED | OUTPATIENT
Start: 2020-03-19 | End: 2020-03-19

## 2020-03-19 RX ORDER — HEPARIN SODIUM 1000 [USP'U]/ML
INJECTION, SOLUTION INTRAVENOUS; SUBCUTANEOUS
Status: COMPLETED | OUTPATIENT
Start: 2020-03-19 | End: 2020-03-19

## 2020-03-19 RX ORDER — SODIUM CHLORIDE 9 MG/ML
INJECTION, SOLUTION INTRAVENOUS ONCE
Status: COMPLETED | OUTPATIENT
Start: 2020-03-19 | End: 2020-03-19

## 2020-03-19 RX ADMIN — MIDAZOLAM HYDROCHLORIDE 2 MG: 1 INJECTION INTRAMUSCULAR; INTRAVENOUS at 14:16

## 2020-03-19 RX ADMIN — MIDAZOLAM HYDROCHLORIDE 2 MG: 1 INJECTION INTRAMUSCULAR; INTRAVENOUS at 14:25

## 2020-03-19 RX ADMIN — FENTANYL CITRATE 50 MCG: 50 INJECTION, SOLUTION INTRAMUSCULAR; INTRAVENOUS at 14:35

## 2020-03-19 RX ADMIN — HEPARIN SODIUM 3500 UNITS: 1000 INJECTION, SOLUTION INTRAVENOUS; SUBCUTANEOUS at 14:34

## 2020-03-19 RX ADMIN — SODIUM CHLORIDE: 9 INJECTION, SOLUTION INTRAVENOUS at 12:34

## 2020-03-19 RX ADMIN — FENTANYL CITRATE 25 MCG: 50 INJECTION, SOLUTION INTRAMUSCULAR; INTRAVENOUS at 14:25

## 2020-03-19 RX ADMIN — ASPIRIN 162 MG: 81 TABLET, CHEWABLE ORAL at 12:34

## 2020-03-19 RX ADMIN — FENTANYL CITRATE 25 MCG: 50 INJECTION, SOLUTION INTRAMUSCULAR; INTRAVENOUS at 14:16

## 2020-03-19 NOTE — H&P
02/23/2010    WILFREDOE CHEIKH 3.0 x 23 pLAD    CYSTOSCOPY      stone removal    CYSTOSCOPY Right 2/11/2019    CYSTOSCOPY, URETEROSCOPY performed by Bret Garcia MD at [facility]  03/18/2019    CYSTOSCOPY, [address] Myrtle Fraser 3/18/19, [employer], [employer] System, Lot: [id number]    OTHER SURGICAL HISTORY  02/11/2019    cystoscopy; ureteroscopy    PROSTHET DEVICE APPLICATION N/A 8/55/5284    CYSTOSCOPY, UROLIFT WITH FULGERATION OF BLEEDING POINTS performed by Bret Garcia MD at [facility], PARTIAL  04    TONSILLECTOMY           Medications:  Current Facility-Administered Medications   Medication Dose Route Frequency Provider Last Rate Last Dose    0.9 % sodium chloride infusion   Intravenous Once Magaly MD Keron               Pre-Sedation:    Pre-Sedation Documentation and Exam:  I have assessed the patient and agree with the H&P present on the chart. Prior History of Anesthesia Complications:   none    Modified Mallampati:  II (soft palate, uvula, fauces visible)    ASA Classification:  Class 3 - A patient with severe systemic disease that limits activity but is not incapacitating      Bria Scale: Activity:  2 - Able to move 4 extremities voluntarily on command  Respiration:  2 - Able to breathe deeply and cough freely  Circulation:  2 - BP+/- 20mmHg of normal  Consciousness:  2 - Fully awake  Oxygen Saturation (color):  2 - Able to maintain oxygen saturation >92% on room air    Sedation/Anesthesia Plan:  Guard the patient's safety and welfare. Minimize physical discomfort and pain. Minimize negative psychological responses to treatment by providing sedation and analgesia and maximize the potential amnesia. Patient to meet pre-procedure discharge plan.     Medication Planned:  midazolam intravenously and fentanyl intravenously    Patient is an appropriate candidate for plan of sedation: yes      Electronically signed by

## 2020-03-23 ENCOUNTER — TELEPHONE (OUTPATIENT)
Dept: CARDIOLOGY CLINIC | Age: 80
End: 2020-03-23

## 2020-04-06 ENCOUNTER — TELEPHONE (OUTPATIENT)
Dept: CARDIOLOGY | Age: 80
End: 2020-04-06

## 2020-04-06 RX ORDER — EZETIMIBE 10 MG/1
10 TABLET ORAL DAILY
Qty: 30 TABLET | Refills: 5 | Status: SHIPPED | OUTPATIENT
Start: 2020-04-06 | End: 2021-04-07 | Stop reason: ALTCHOICE

## 2020-04-06 NOTE — TELEPHONE ENCOUNTER
Pt has office visit 4/8/20 s/p [facility] 3/19/20. He does not feel comfortable coming to office in view of COVID-19 concern. Is asking if office visit is necessary, would telephone call  be sufficient? He would like your recommendation Dr. Samra Jimenez. [facility] 3/19/20  LEFT HEART CATH  LM: normal  LAD: prox stent with mild restenosis 30%  LCX: Dominant,  Luminals                OM1- luminals                PDA- 20%  RCA: nondominant     LVEDP: 7  LVEF: 65%        Assessment  1. Patent LAD stent. Nonobstructive CAD  2. Cont ASA<                 - no BB due to bradycardia                - I would suggest restarting statin given CR risk reduction- regardles of pt's lipids  3.  Trial of L-arginine 500mg po BID

## 2020-04-07 ENCOUNTER — VIRTUAL VISIT (OUTPATIENT)
Dept: FAMILY MEDICINE CLINIC | Age: 80
End: 2020-04-07
Payer: MEDICARE

## 2020-04-07 PROCEDURE — 99214 OFFICE O/P EST MOD 30 MIN: CPT | Performed by: FAMILY MEDICINE

## 2020-04-07 PROCEDURE — 4040F PNEUMOC VAC/ADMIN/RCVD: CPT | Performed by: FAMILY MEDICINE

## 2020-04-07 PROCEDURE — 1123F ACP DISCUSS/DSCN MKR DOCD: CPT | Performed by: FAMILY MEDICINE

## 2020-04-07 PROCEDURE — G8427 DOCREV CUR MEDS BY ELIG CLIN: HCPCS | Performed by: FAMILY MEDICINE

## 2020-04-07 RX ORDER — ZOLPIDEM TARTRATE 10 MG/1
TABLET ORAL
Qty: 60 TABLET | Refills: 2 | Status: SHIPPED | OUTPATIENT
Start: 2020-04-07 | End: 2020-07-16 | Stop reason: SDUPTHER

## 2020-04-07 RX ORDER — DIAZEPAM 10 MG/1
10 TABLET ORAL NIGHTLY PRN
Qty: 30 TABLET | Refills: 2 | Status: SHIPPED | OUTPATIENT
Start: 2020-04-07 | End: 2020-07-16 | Stop reason: SDUPTHER

## 2020-04-07 RX ORDER — MONTELUKAST SODIUM 10 MG/1
10 TABLET ORAL DAILY
Qty: 30 TABLET | Refills: 5 | Status: SHIPPED | OUTPATIENT
Start: 2020-04-07 | End: 2020-07-16 | Stop reason: SDUPTHER

## 2020-04-07 ASSESSMENT — PATIENT HEALTH QUESTIONNAIRE - PHQ9
1. LITTLE INTEREST OR PLEASURE IN DOING THINGS: 0
2. FEELING DOWN, DEPRESSED OR HOPELESS: 0
SUM OF ALL RESPONSES TO PHQ QUESTIONS 1-9: 0
SUM OF ALL RESPONSES TO PHQ QUESTIONS 1-9: 0
SUM OF ALL RESPONSES TO PHQ9 QUESTIONS 1 & 2: 0

## 2020-04-07 NOTE — PROGRESS NOTES
2020    TELEHEALTH EVALUATION -- Audio/Visual (During ZLUXB-54 public health emergency) using [employer]. me. ASSESSMENT and PLAN    1. Atherosclerosis of native coronary artery of native heart without angina pectoris  Currently stable. Follow-up with Dr. Ginna Bright. 2. Insomnia, psychophysiological  Stable  - zolpidem (AMBIEN) 10 MG tablet; TAKE 1/2 TAB (5 MG) EVERY 2 HOURS FOR SLEEP X 4 DOSES  Dispense: 60 tablet; Refill: 2    3. Restless legs syndrome  Stable  - diazePAM (VALIUM) 10 MG tablet; Take 1 tablet by mouth nightly as needed (restless legs) for up to 90 days. Dispense: 30 tablet; Refill: 2    4. Pure hypercholesterolemia  A long discussion concerning Zetia with he and his wife. I advised him that there was not a general widespread population benefit from Zetia, but certain individuals likely did benefit. I did reassure them to [facility] it was a very safe medication. While they decide, in the meanwhile we will plan on rechecking a lipid profile in 3 months    5. Allergic sinusitis  Stable    RTC in office in 6 months. Continue all current medications as listed below. Dipesh Gordon MD    History:    Burton Krishnamurthy. (:  1940) has requested an audio/video evaluation for the following concern(s): Follow-up of his coronary artery disease, hypercholesterolemia, allergic sinusitis, insomnia, and restless legs. Several weeks ago he developed chest pain and underwent coronary angiography. He reports he had a 20% obstruction in his stent. Since then he has felt well. He has statin intolerance. His recent LDL had risen to 117, and Dr. Ginna Bright recommended Zetia. His wife was very worried about the potential side effects. Otherwise he is doing well. His allergies remained under good control. He still takes a total of 20 mg staggered in the evening and night for his insomnia. He continues to tolerate it well.   He still takes occasional diazepam for restless legs and tolerates it well also.      Husam Livingston report for [place] and [place] for the last 12 months was requested and reviewed today. The results of the report was consistent with the current treatment plan. Prior to Visit Medications    Medication Sig Taking? Authorizing Provider   montelukast (SINGULAIR) 10 MG tablet Take 1 tablet by mouth daily Yes Nomi Toussaint MD   zolpidem (AMBIEN) 10 MG tablet TAKE 1/2 TAB (5 MG) EVERY 2 HOURS FOR SLEEP X 4 DOSES Yes Nomi Toussaint MD   diazePAM (VALIUM) 10 MG tablet Take 1 tablet by mouth nightly as needed (restless legs) for up to 90 days. Yes Nomi Toussaint MD   l-arginine 1000 MG TABS Take 1,000 mg by mouth 2 times daily Yes Rebecca Layne MD   Magnesium 300 MG CAPS Take 400 mg by mouth  Yes Historical Provider, MD   hydrocortisone (PROCTO-MED HC) 2.5 % rectal cream PLACE RECTALLY TWICE DAILY Yes Nomi Toussaint MD   nitroGLYCERIN (NITROSTAT) 0.4 MG SL tablet Place 0.4 mg under the tongue every 5 minutes as needed for Chest pain up to max of 3 total doses. If no relief after 1 dose, call 911.  Yes Historical Provider, MD   aspirin 325 MG tablet Take 162 mg by mouth daily Yes Historical Provider, MD   Spacer/Aero-Holding Chambers (POCKET SPACER) MIGUEL Use with inhaler Yes Nomi Toussaint MD   ECHINACEA PO Take by mouth Yes Historical Provider, MD   docusate sodium (COLACE) 100 MG capsule Take 100 mg by mouth daily Yes Historical Provider, MD   Ascorbic Acid (VITAMIN C) 500 MG tablet Take 500 mg by mouth daily Takes 4 tabs daily Yes Historical Provider, MD   Cholecalciferol (VITAMIN D3) 1000 UNITS CAPS Take 4,000 Units by mouth daily  Patient taking differently: Take 2,000 Units by mouth daily  Yes Nomi Toussaint MD   ezetimibe (ZETIA) 10 MG tablet Take 1 tablet by mouth daily  Patient not taking: Reported on 4/7/2020  Taylor Calloway MD       Social History     Tobacco Use    Smoking status: Former Smoker     Packs/day: 1.00     Years: 10.00     Pack years: 10.00     Types: Cigarettes     Start

## 2020-06-23 ENCOUNTER — NURSE ONLY (OUTPATIENT)
Dept: FAMILY MEDICINE CLINIC | Age: 80
End: 2020-06-23
Payer: MEDICARE

## 2020-06-23 LAB
CHOLESTEROL, TOTAL: 163 MG/DL (ref 0–199)
HDLC SERPL-MCNC: 50 MG/DL (ref 40–60)
LDL CHOLESTEROL CALCULATED: 98 MG/DL
TRIGL SERPL-MCNC: 76 MG/DL (ref 0–150)
VLDLC SERPL CALC-MCNC: 15 MG/DL

## 2020-06-23 PROCEDURE — 36415 COLL VENOUS BLD VENIPUNCTURE: CPT | Performed by: FAMILY MEDICINE

## 2020-06-23 NOTE — PROGRESS NOTES
Marleni Palomino blood out rt lower wrist  space  with 23 gauge Butterfly needle, without incident, applied pressure to draw site and applied bandaid, lisette 1 tubes.

## 2020-07-17 RX ORDER — DIAZEPAM 10 MG/1
10 TABLET ORAL NIGHTLY PRN
Qty: 30 TABLET | Refills: 2 | Status: SHIPPED | OUTPATIENT
Start: 2020-07-17 | End: 2022-10-31

## 2020-07-17 RX ORDER — ZOLPIDEM TARTRATE 10 MG/1
TABLET ORAL
Qty: 60 TABLET | Refills: 2 | Status: SHIPPED | OUTPATIENT
Start: 2020-07-17 | End: 2022-10-31

## 2020-07-17 RX ORDER — MONTELUKAST SODIUM 10 MG/1
10 TABLET ORAL DAILY
Qty: 30 TABLET | Refills: 5 | Status: SHIPPED | OUTPATIENT
Start: 2020-07-17

## 2020-07-17 NOTE — TELEPHONE ENCOUNTER
Valium 10mg-last filled 04/07/2020 #30 and 2 refills  Ambien 10mg-last filled on 04/07/2020 #60 and 2 refills rc  Future appt scheduled 10/23/2020  Last appt 04/07/2020

## 2020-10-02 LAB
ALBUMIN SERPL-MCNC: 4.1 G/DL
ALP BLD-CCNC: 69 U/L
ALT SERPL-CCNC: 23 U/L
ANION GAP SERPL CALCULATED.3IONS-SCNC: NORMAL MMOL/L
AST SERPL-CCNC: 23 U/L
BASOPHILS ABSOLUTE: 0 /ΜL
BASOPHILS RELATIVE PERCENT: 1 %
BILIRUB SERPL-MCNC: 0.7 MG/DL (ref 0.1–1.4)
BUN BLDV-MCNC: 12 MG/DL
CALCIUM SERPL-MCNC: 9 MG/DL
CHLORIDE BLD-SCNC: 103 MMOL/L
CHOLESTEROL, TOTAL: 169 MG/DL
CHOLESTEROL/HDL RATIO: NORMAL
CO2: 25 MMOL/L
CREAT SERPL-MCNC: 1.14 MG/DL
EOSINOPHILS ABSOLUTE: 0 /ΜL
EOSINOPHILS RELATIVE PERCENT: 1 %
GFR CALCULATED: NORMAL
GLUCOSE BLD-MCNC: 92 MG/DL
HCT VFR BLD CALC: 41 % (ref 41–53)
HDLC SERPL-MCNC: 50 MG/DL (ref 35–70)
HEMOGLOBIN: 14.1 G/DL (ref 13.5–17.5)
LDL CHOLESTEROL CALCULATED: 104 MG/DL (ref 0–160)
LYMPHOCYTES ABSOLUTE: 1.1 /ΜL
LYMPHOCYTES RELATIVE PERCENT: 27 %
MCH RBC QN AUTO: 32.2 PG
MCHC RBC AUTO-ENTMCNC: 34.4 G/DL
MCV RBC AUTO: 94 FL
MONOCYTES ABSOLUTE: 0.5 /ΜL
MONOCYTES RELATIVE PERCENT: 12 %
NEUTROPHILS ABSOLUTE: 2.5 /ΜL
NEUTROPHILS RELATIVE PERCENT: 59 %
NONHDLC SERPL-MCNC: NORMAL MG/DL
PLATELET # BLD: 218 K/ΜL
PMV BLD AUTO: NORMAL FL
POTASSIUM SERPL-SCNC: 4.4 MMOL/L
RBC # BLD: 4.38 10^6/ΜL
SARS-COV-2: NEGATIVE
SODIUM BLD-SCNC: 141 MMOL/L
TOTAL PROTEIN: 6.5
TRIGL SERPL-MCNC: 78 MG/DL
VITAMIN D 25-HYDROXY: 18.2
VITAMIN D2, 25 HYDROXY: NORMAL
VITAMIN D3,25 HYDROXY: NORMAL
VLDLC SERPL CALC-MCNC: 15 MG/DL
WBC # BLD: 4.1 10^3/ML

## 2021-03-19 RX ORDER — MONTELUKAST SODIUM 10 MG/1
10 TABLET ORAL DAILY
Qty: 30 TABLET | Refills: 5 | OUTPATIENT
Start: 2021-03-19

## 2021-04-06 LAB
CHOLESTEROL, TOTAL: 159 MG/DL
CHOLESTEROL/HDL RATIO: NORMAL
HDLC SERPL-MCNC: 53 MG/DL (ref 35–70)
LDL CHOLESTEROL CALCULATED: 92 MG/DL (ref 0–160)
NONHDLC SERPL-MCNC: NORMAL MG/DL
PROSTATE SPECIFIC ANTIGEN: 1.8 NG/ML
TRIGL SERPL-MCNC: 73 MG/DL
VLDLC SERPL CALC-MCNC: 14 MG/DL

## 2021-04-06 NOTE — PROGRESS NOTES
[address] Office Note  4/7/2021     Subjective:  Mr. Socrates Gray is here for cardiac follow up of his CAD and hyperlipidemia. S/P cardiac cath 3/19/2020    HPI:  He underwent cardiac cath on 3/19/2020 demonstrated patent LAD stent; non-obstructive CAD. Today he reports having an episode about a month ago with muscle pain in his shoulder. He denies chest pain, cough, or shortness of breath associated with it. His blood pressure went up. He took a nitro and this relieved his pain and brought his blood pressure down.  he did not go to ED and has not had another episode. He reports that his fatigue has been worsening. After he uses a chain saw for about 20 minutes, he has to stop and rest. He does not take Valium every night. He takes about half of [place] . He naps after lunch for about an hour after reading a magazine. He had received one of the COVID vaccines on 1/29/21. He has not been taking Zetia. PMH:  Patient previously seen Dr. Clark Scott . He underwent cardiac cath in 2010 with stent placement to the LAD. He has been intolerant to statins and has been managed on Welchol. He reports to having myalgias, weakness, headaches and anuria with statin therapy. Welchol was then stopped. He is monitoring just diet and doing well. LDL 91 10.3. 19. Came to hospital 11/26/14 Patient reported moderate chest pain, radiation to his jaw and arm with associated SOB, nausea, diaphoresis. He reportedly took an 81 mg aspirin at home as well as 2 nitroglycerin. EMS squad arrived to find patient lightheaded with systolic blood pressure in the 80s. Blood pressure stabilized throughout transport to the emergency department. Upon arrival, patient denies any pain. Cardiologist Dr. Betsy Howard seen and examined and had patient  transferred to [facility] where he underwent LHC 11/26/14 which revealed LM: normals LAD: previous proximal stent patent with 30-40% instent restenosis at distal stent edge. LCX: DOMINANT, luminals.  Om1 with 98%   BMI 22.86 kg/m²     Wt Readings from Last 3 Encounters:   04/07/21 154 lb 12.8 oz (70.2 kg)   03/19/20 155 lb (70.3 kg)   03/18/20 158 lb 12.8 oz (72 kg)       Physical Exam:  General: No Respiratory distress, appears well developed and well nourished. Eyes:  Sclera nonicteric  Nose/Sinuses:  negative findings: nose shows no deformity, asymmetry, or inflammation, nasal mucosa normal, septum midline with no perforation or bleeding  Back:  no pain to palpation  Joint:  no active joint inflammation  Musculoskeletal:  negative  Skin:  Warm and dry  Neck:  Negative for JVD and Carotid Bruits. Chest:  Clear to auscultation, respiration easy  Cardiovascular: RRR  Bradycardiac 56-60/ min S2 normal, no murmur, no rub or thrill. Extremities:   No edema, No clubbing, cyanosis,  Pulses: Radials and pedal  pulses are normal.  Neuro: intact    Medications:   Outpatient Encounter Medications as of 4/7/2021   Medication Sig Dispense Refill    Lysine 1000 MG TABS Take 3,000 mg by mouth once a week      Psyllium (PSYLDEX PO) Take by mouth      Omega-3 Fatty Acids (FISH OIL) 1000 MG CAPS Take 3,000 mg by mouth 3 times daily      Zinc 22.5 MG TABS Take 2 mLs by mouth      montelukast (SINGULAIR) 10 MG tablet Take 1 tablet by mouth daily 30 tablet 5    zolpidem (AMBIEN) 10 MG tablet TAKE 1/2 TAB (5 MG) EVERY 2 HOURS FOR SLEEP X 4 DOSES (Patient taking differently: 20 mg. TAKE 1/2 TAB (5 MG) EVERY 2 HOURS FOR SLEEP X 4 DOSES) 60 tablet 2    diazePAM (VALIUM) 10 MG tablet Take 1 tablet by mouth nightly as needed (restless legs) for up to 90 days. 30 tablet 2    Magnesium 300 MG CAPS Take 400 mg by mouth       hydrocortisone (PROCTO-MED HC) 2.5 % rectal cream PLACE RECTALLY TWICE DAILY 30 g 0    nitroGLYCERIN (NITROSTAT) 0.4 MG SL tablet Place 0.4 mg under the tongue every 5 minutes as needed for Chest pain up to max of 3 total doses. If no relief after 1 dose, call 911.       aspirin 325 MG tablet Take 162 mg by mouth daily      Spacer/Aero-Holding Chambers (POCKET SPACER) MIGUEL Use with inhaler 1 Device 1    ECHINACEA PO Take by mouth      docusate sodium (COLACE) 100 MG capsule Take 100 mg by mouth daily      Ascorbic Acid (VITAMIN C) 500 MG tablet Take 500 mg by mouth daily Takes 4 tabs daily      Cholecalciferol (VITAMIN D3) 1000 UNITS CAPS Take 4,000 Units by mouth daily (Patient taking differently: Take 800 Units by mouth daily ) 1 capsule 0    [DISCONTINUED] ezetimibe (ZETIA) 10 MG tablet Take 1 tablet by mouth daily (Patient not taking: Reported on 4/7/2021) 30 tablet 5    l-arginine 1000 MG TABS Take 1,000 mg by mouth 2 times daily (Patient not taking: Reported on 4/7/2021) 30 tablet 3     No facility-administered encounter medications on file as of 4/7/2021. Lab Data:  CBC: No results for input(s): WBC, HGB, HCT, MCV, PLT in the last 72 hours. BMP: No results for input(s): NA, K, CL, CO2, PHOS, BUN, CREATININE in the last 72 hours. Invalid input(s): CA  LIVER PROFILE: No results for input(s): AST, ALT, LIPASE, BILIDIR, BILITOT, ALKPHOS in the last 72 hours. Invalid input(s): AMYLASE,  ALB  LIPID:   No components found for: CHLPL  Lab Results   Component Value Date    TRIG 73 04/06/2021    TRIG 76 06/23/2020    TRIG 88 10/03/2019     Lab Results   Component Value Date    HDL 53 04/06/2021    HDL 50 06/23/2020    HDL 52 03/19/2020     Lab Results   Component Value Date    LDLCALC 92 04/06/2021    LDLCALC 98 06/23/2020    LDLCALC 119 (H) 03/19/2020     Lab Results   Component Value Date    LABVLDL 15 06/23/2020    LABVLDL 13 03/19/2020    LABVLDL 18 10/03/2019     PT/INR: No results for input(s): PROTIME, INR in the last 72 hours. A1C:   No results found for: LABA1C  BNP:  No results for input(s): BNP in the last 72 hours.    Labs from Oct 2020 with Dr Alyx Xiao reviewed   CBC CMP LIPID and Vit D normal except low Vit D  Last lipids   6/10/15Cholesterol, Total 166 Triglycerides 92 HDL 59  LDL Calculated 89    8/5/14  Cholesterol, Total 161   Triglycerides 103   HDL 53    LDL Calculated 87    IMAGING:   CATH 3/19/20  LEFT HEART CATH  LM: normal  LAD: prox stent with mild restenosis 30%  LCX: Dominant,  Luminals                OM1- luminals                PDA- 20%  RCA: nondominant     LVEDP: 7  LVEF: 65%    EKG 3/19/20  Sinus bradycardia with Premature atrial complexesOtherwise normal ECGWhen compared with ECG of 02-AUG-2019 19:38,Premature atrial complexes are now PresentT wave inversion no longer evident in Inferior leadsConfirmed by Rita Vanessa ([id number]) on 3/19/2020 6:40:54 PM    EKG 2/25/20   Marked sinus  Bradycardia  - occasional PAC     # PACs = 1. Poor SNR (< 1) in leads  AVL   BORDERLINE RHYTHM    GXT stress test 8/16/19  Baseline resting EKG is normal.   Patient exercised on treadmill according to standard Jayjay protocol for 5 minutes and achieved 88% of predicted maximum heart rate. Normal BP response to stress. Occasional PVC during exercise. There was stress induced shortness of breath. There was stress induced fatigue. Symptoms resolved with rest.   No EKG changes of stress induced left ventricular ischemia. Ruiz treadmill score 5 indicates low risk. EKG 8/2/19  Sinus bradycardiaOtherwise normal ECGConfirmed by Stan Peace MD, Janna Hylton ([id number]) on 8/3/2019 9:45:21 AM     CXR 8/2/19  FINDINGS: There is moderate gaseous distension of the stomach. No acute bony abnormality. The heart size and mediastinal contours are within normal limits, stable. The lungs are clear     Carotid US 10/18/16  The right internal carotid artery demonstrates 0-50% stenosis . The left internal carotid artery demonstrates 0-50% stenosis . Bilateral vertebral arteries are patent with flow in the normal direction.     EKG 11/26/14  Sinus bradycardia with marked sinus arrhythmiaNo previous ECGs availableConfirmed by AMY Chan MD ([id number]) on 11/26/2014   CATH 11/26/14  Findings:     LM: normals  LAD: previous proximal stent patent with 30-40% instent restenosis at distal stent edge. LCX: DOMINANT, luminals. Om1 with mid 20%, distal LCX/PDA with 10-20%  RCA: small nonodominant    LVEDP 7  LVEF 65%  Plan:  1. Patent LAD stent  2. Nonobstructive CAD    Assessment:  Encounter Diagnoses   Name Primary?  Atherosclerosis of native coronary artery of native heart with stable angina pectoris ([facility] [address] Yes    Pure hypercholesterolemia     Chronic fatigue        Arnold Everett was seen today for coronary artery disease, hyperlipidemia   Vit D Def  CAD with stable angina    CAD (coronary artery disease): sp stent to the LAD in 2010. Hyperlipidemia: intolerant to statin therapy. Remains on fish oil does not want to take Zetia wants to do natural ways diet and supplements. Last lipids 6/23/20  Results reviewed in epic  Statin intolerance      Plan:  1. Medications reviewed. Refills not necessary only meds he takes is nitro which he has. 2. Labs TSH  3. Healthy life style and diet exercise  4. Follow up in 1 year    This note was scribed in the presence of Jasmine Temple MD by Chance Lucia RN. QUALITY MEASURES  1. Tobacco Cessation Counseling: NA  2. Retake of BP if >140/90:   NA  3. Documentation to PCP/referring for new patient:  Sent to PCP at close of office visit  4. CAD patient on anti-platelet: Yes  5. CAD patient on STATIN therapy: Not unable to tolerate statin  6. Patient with CHF and aFib on anticoagulation:  NA   I, Dr. Kaveh Yanez, personally performed the services described in this documentation, as scribed by the above signed scribe in my presence. It is both accurate and complete to my knowledge. I agree with the details independently gathered by the clinical support staff, while the remaining scribed note accurately describes my personal service to the patient.       Marcia Sullivan MD 4/7/2021 4:34 PM

## 2021-04-06 NOTE — PROGRESS NOTES
as in  [facility], Cardiovascular, pulmonary, GI, , Musculoskeletal, skin, neurological, hematological, endocrine, Psychiatric    Reviewed past medical history, social, and family history. Nonsmoker  Father: kidney and heart failure   at 80  Mother: heart disease  at 80 CHF  Smoke for 25 years     Past Medical History:   Diagnosis Date    Benign prostatic disease     CAD (coronary artery disease)     CHF (congestive heart failure) (HCC)     Chronic fatigue     Hyperlipidemia     Kidney stones     Dr Carolina Yancey, takes TheraLith XR supplement    Routine health maintenance     TAC 7/10 ; sees urologist routinely    Vitamin D deficiency disease      Past Surgical History:   Procedure Laterality Date    CORONARY ANGIOPLASTY WITH STENT PLACEMENT  2010    XIENCE CHEIKH 3.0 x 23 pLAD    CYSTOSCOPY      stone removal    CYSTOSCOPY Right 2019    CYSTOSCOPY, URETEROSCOPY performed by Jett Hdz MD at [address] [place] [address]  2019    CYSTOSCOPY, [address] w[address] Dr Ramey Earing 3/18/19, [employer], Urolift System, Lot: [id number]   Ketty Qiu OTHER SURGICAL HISTORY  2019    cystoscopy; ureteroscopy    PROSTHET DEVICE APPLICATION N/A     CYSTOSCOPY, UROLIFT WITH FULGERATION OF BLEEDING POINTS performed by Jett Hdz MD at [address] [place] [address] Dr, PARTIAL  04    TONSILLECTOMY         Objective: There were no vitals taken for this visit. Wt Readings from Last 3 Encounters:   20 155 lb (70.3 kg)   20 158 lb 12.8 oz (72 kg)   20 160 lb 4 oz (72.7 kg)       Physical Exam:  General: No Respiratory distress, appears well developed and well nourished.    Eyes:  Sclera nonicteric  Nose/Sinuses:  negative findings: nose shows no deformity, asymmetry, or inflammation, nasal mucosa normal, septum midline with no perforation or bleeding  Back:  no pain to palpation  Joint:  no active joint inflammation  Musculoskeletal: negative  Skin:  Warm and dry  Neck:  Negative for JVD and Carotid Bruits. Chest:  Clear to auscultation, respiration easy  Cardiovascular: RRR  S2 normal, no murmur, no rub or thrill. Extremities:   No edema, No clubbing, cyanosis,  Pulses: Radials and pedal  pulses are normal.  Neuro: intact    Medications:   Outpatient Encounter Medications as of 4/7/2021   Medication Sig Dispense Refill    montelukast (SINGULAIR) 10 MG tablet Take 1 tablet by mouth daily 30 tablet 5    zolpidem (AMBIEN) 10 MG tablet TAKE 1/2 TAB (5 MG) EVERY 2 HOURS FOR SLEEP X 4 DOSES 60 tablet 2    diazePAM (VALIUM) 10 MG tablet Take 1 tablet by mouth nightly as needed (restless legs) for up to 90 days. 30 tablet 2    ezetimibe (ZETIA) 10 MG tablet Take 1 tablet by mouth daily (Patient not taking: Reported on 4/7/2020) 30 tablet 5    l-arginine 1000 MG TABS Take 1,000 mg by mouth 2 times daily 30 tablet 3    Magnesium 300 MG CAPS Take 400 mg by mouth       hydrocortisone (PROCTO-MED HC) 2.5 % rectal cream PLACE RECTALLY TWICE DAILY 30 g 0    nitroGLYCERIN (NITROSTAT) 0.4 MG SL tablet Place 0.4 mg under the tongue every 5 minutes as needed for Chest pain up to max of 3 total doses. If no relief after 1 dose, call 911.  aspirin 325 MG tablet Take 162 mg by mouth daily      Spacer/Aero-Holding Chambers (POCKET SPACER) MIGUEL Use with inhaler 1 Device 1    ECHINACEA PO Take by mouth      docusate sodium (COLACE) 100 MG capsule Take 100 mg by mouth daily      Ascorbic Acid (VITAMIN C) 500 MG tablet Take 500 mg by mouth daily Takes 4 tabs daily      Cholecalciferol (VITAMIN D3) 1000 UNITS CAPS Take 4,000 Units by mouth daily (Patient taking differently: Take 2,000 Units by mouth daily ) 1 capsule 0     No facility-administered encounter medications on file as of 4/7/2021. Lab Data:  CBC: No results for input(s): WBC, HGB, HCT, MCV, PLT in the last 72 hours.   BMP: No results for input(s): NA, K, CL, CO2, PHOS, BUN, CREATININE in the last 72 hours. Invalid input(s): CA  LIVER PROFILE: No results for input(s): AST, ALT, LIPASE, BILIDIR, BILITOT, ALKPHOS in the last 72 hours. Invalid input(s): AMYLASE,  ALB  LIPID:   No components found for: CHLPL  Lab Results   Component Value Date    TRIG 76 06/23/2020    TRIG 88 10/03/2019    TRIG 83 03/11/2019     Lab Results   Component Value Date    HDL 50 06/23/2020    HDL 52 03/19/2020    HDL 68 (H) 10/03/2019     Lab Results   Component Value Date    LDLCALC 98 06/23/2020    LDLCALC 119 (H) 03/19/2020    LDLCALC 91 10/03/2019     Lab Results   Component Value Date    LABVLDL 15 06/23/2020    LABVLDL 13 03/19/2020    LABVLDL 18 10/03/2019     PT/INR: No results for input(s): PROTIME, INR in the last 72 hours. A1C:   No results found for: LABA1C  BNP:  No results for input(s): BNP in the last 72 hours. Last lipids   6/10/15Cholesterol, Total 166 Triglycerides 92 HDL 59  LDL Calculated 89    8/5/14  Cholesterol, Total 161   Triglycerides 103   HDL 53    LDL Calculated 87    IMAGING:   CATH 3/19/20  LEFT HEART CATH  LM: normal  LAD: prox stent with mild restenosis 30%  LCX: Dominant,  Luminals                OM1- luminals                PDA- 20%  RCA: nondominant     LVEDP: 7  LVEF: 65%    EKG 3/19/20  Sinus bradycardia with Premature atrial complexesOtherwise normal ECGWhen compared with ECG of 02-AUG-2019 19:38,Premature atrial complexes are now PresentT wave inversion no longer evident in Inferior leadsConfirmed by Stuart Andrade ([id number]) on 3/19/2020 6:40:54 PM    EKG 2/25/20   Marked sinus  Bradycardia  - occasional PAC     # PACs = 1. Poor SNR (< 1) in leads  AVL   BORDERLINE RHYTHM    GXT stress test 8/16/19  Baseline resting EKG is normal.   Patient exercised on treadmill according to standard Jayjay protocol for 5 minutes and achieved 88% of predicted maximum heart rate. Normal BP response to stress. Occasional PVC during exercise.    There was stress induced shortness of breath. There was stress induced fatigue. Symptoms resolved with rest.   No EKG changes of stress induced left ventricular ischemia. Ruiz treadmill score 5 indicates low risk. EKG 8/2/19  Sinus bradycardiaOtherwise normal ECGConfirmed by Laura Ahn MD, Syeda Cruz ([id number]) on 8/3/2019 9:45:21 AM     CXR 8/2/19  FINDINGS: There is moderate gaseous distension of the stomach. No acute bony abnormality. The heart size and mediastinal contours are within normal limits, stable. The lungs are clear     Carotid US 10/18/16  The right internal carotid artery demonstrates 0-50% stenosis . The left internal carotid artery demonstrates 0-50% stenosis . Bilateral vertebral arteries are patent with flow in the normal direction. EKG 11/26/14  Sinus bradycardia with marked sinus arrhythmiaNo previous ECGs availableConfirmed by AMY NICHOLSON MD ([id number]) on 11/26/2014   CATH 11/26/14  Findings:     LM: normals  LAD: previous proximal stent patent with 30-40% instent restenosis at distal stent edge. LCX: DOMINANT, luminals. Om1 with mid 20%, distal LCX/PDA with 10-20%  RCA: small nonodominant    LVEDP 7  LVEF 65%  Plan:  1. Patent LAD stent  2. Nonobstructive CAD    Assessment:  Lindsay Pickett was seen today for coronary artery disease, hyperlipidemia and check-up. Unstable angina pectoris  Diagnoses and associated orders for this visit:    CAD (coronary artery disease): sp stent to the LAD in 2010. Hyperlipidemia: intolerant to statin therapy. Remains on fish oil  Last lipids 6/23/20  Results reviewed in epic  Statin intolerance      Plan:  1. Medications reviewed. 2.   3.    QUALITY MEASURES  1. Tobacco Cessation Counseling: NA  2. Retake of BP if >140/90:   NA  3. Documentation to PCP/referring for new patient:  Sent to PCP at close of office visit  4. CAD patient on anti-platelet: Yes  5. CAD patient on STATIN therapy: Not unable to tolerate statin  6.  Patient with CHF and aFib on anticoagulation:  TRENT Preston

## 2021-04-07 ENCOUNTER — TELEPHONE (OUTPATIENT)
Dept: CARDIOLOGY CLINIC | Age: 81
End: 2021-04-07

## 2021-04-07 ENCOUNTER — OFFICE VISIT (OUTPATIENT)
Dept: CARDIOLOGY CLINIC | Age: 81
End: 2021-04-07
Payer: MEDICARE

## 2021-04-07 VITALS
WEIGHT: 154.8 LBS | BODY MASS INDEX: 22.93 KG/M2 | HEIGHT: 69 IN | DIASTOLIC BLOOD PRESSURE: 60 MMHG | TEMPERATURE: 97.8 F | HEART RATE: 54 BPM | SYSTOLIC BLOOD PRESSURE: 124 MMHG | OXYGEN SATURATION: 98 %

## 2021-04-07 DIAGNOSIS — E78.00 PURE HYPERCHOLESTEROLEMIA: ICD-10-CM

## 2021-04-07 DIAGNOSIS — I25.118 ATHEROSCLEROSIS OF NATIVE CORONARY ARTERY OF NATIVE HEART WITH STABLE ANGINA PECTORIS (HCC): Primary | ICD-10-CM

## 2021-04-07 DIAGNOSIS — R53.82 CHRONIC FATIGUE: ICD-10-CM

## 2021-04-07 PROCEDURE — G8427 DOCREV CUR MEDS BY ELIG CLIN: HCPCS | Performed by: INTERNAL MEDICINE

## 2021-04-07 PROCEDURE — 4040F PNEUMOC VAC/ADMIN/RCVD: CPT | Performed by: INTERNAL MEDICINE

## 2021-04-07 PROCEDURE — G8420 CALC BMI NORM PARAMETERS: HCPCS | Performed by: INTERNAL MEDICINE

## 2021-04-07 PROCEDURE — 1036F TOBACCO NON-USER: CPT | Performed by: INTERNAL MEDICINE

## 2021-04-07 PROCEDURE — 99214 OFFICE O/P EST MOD 30 MIN: CPT | Performed by: INTERNAL MEDICINE

## 2021-04-07 PROCEDURE — 1123F ACP DISCUSS/DSCN MKR DOCD: CPT | Performed by: INTERNAL MEDICINE

## 2021-04-07 RX ORDER — CHLORAL HYDRATE 500 MG
3000 CAPSULE ORAL 3 TIMES DAILY
COMMUNITY

## 2021-04-07 NOTE — TELEPHONE ENCOUNTER
After reviewing Dr. Shante Jefferson lab results. Dr Kathy Molian gave verbal message for patient to increase Vitamin D to 2000mg daily, as he is currently taking 4000mg weekly.   Pt and wife informed

## 2021-06-25 ENCOUNTER — HOSPITAL ENCOUNTER (OUTPATIENT)
Age: 81
Discharge: HOME | End: 2021-06-25
Payer: MEDICARE

## 2021-06-25 LAB
BASOPHILS ABSOLUTE: 0 K/UL (ref 0–0.2)
BASOPHILS RELATIVE PERCENT: 0.8 %
EOSINOPHILS ABSOLUTE: 0.1 K/UL (ref 0–0.6)
EOSINOPHILS RELATIVE PERCENT: 1.5 %
HCT VFR BLD CALC: 43.2 % (ref 40.5–52.5)
HEMOGLOBIN: 14.6 G/DL (ref 13.5–17.5)
LYMPHOCYTES ABSOLUTE: 1.4 K/UL (ref 1–5.1)
LYMPHOCYTES RELATIVE PERCENT: 24.7 %
MCH RBC QN AUTO: 32.1 PG (ref 26–34)
MCHC RBC AUTO-ENTMCNC: 33.9 G/DL (ref 31–36)
MCV RBC AUTO: 94.6 FL (ref 80–100)
MONOCYTES ABSOLUTE: 0.6 K/UL (ref 0–1.3)
MONOCYTES RELATIVE PERCENT: 10.9 %
NEUTROPHILS ABSOLUTE: 3.6 K/UL (ref 1.7–7.7)
NEUTROPHILS RELATIVE PERCENT: 62.1 %
PDW BLD-RTO: 13.3 % (ref 12.4–15.4)
PLATELET # BLD: 218 K/UL (ref 135–450)
PMV BLD AUTO: 7.3 FL (ref 5–10.5)
RBC # BLD: 4.56 M/UL (ref 4.2–5.9)
SEDIMENTATION RATE, ERYTHROCYTE: 6 MM/HR (ref 0–20)
WBC # BLD: 5.7 K/UL (ref 4–11)

## 2021-06-25 PROCEDURE — 86140 C-REACTIVE PROTEIN: CPT

## 2021-06-25 PROCEDURE — 85652 RBC SED RATE AUTOMATED: CPT

## 2021-06-25 PROCEDURE — 85025 COMPLETE CBC W/AUTO DIFF WBC: CPT

## 2021-06-25 PROCEDURE — 36415 COLL VENOUS BLD VENIPUNCTURE: CPT

## 2021-06-26 ENCOUNTER — HOSPITAL ENCOUNTER (EMERGENCY)
Age: 81
Discharge: HOME | End: 2021-06-26
Attending: EMERGENCY MEDICINE
Payer: MEDICARE

## 2021-06-26 VITALS
HEART RATE: 52 BPM | TEMPERATURE: 98.1 F | OXYGEN SATURATION: 98 % | RESPIRATION RATE: 16 BRPM | HEIGHT: 69 IN | BODY MASS INDEX: 22.22 KG/M2 | WEIGHT: 150 LBS | SYSTOLIC BLOOD PRESSURE: 104 MMHG | DIASTOLIC BLOOD PRESSURE: 84 MMHG

## 2021-06-26 DIAGNOSIS — K12.0 APHTHOUS ULCER: Primary | ICD-10-CM

## 2021-06-26 LAB — C-REACTIVE PROTEIN: <3 MG/L (ref 0–5.1)

## 2021-06-26 PROCEDURE — 99282 EMERGENCY DEPT VISIT SF MDM: CPT

## 2021-06-26 RX ORDER — CHLORHEXIDINE GLUCONATE 0.12 MG/ML
15 RINSE ORAL 2 TIMES DAILY
Qty: 420 ML | Refills: 0 | Status: SHIPPED | OUTPATIENT
Start: 2021-06-26 | End: 2021-07-10

## 2021-06-26 ASSESSMENT — PAIN DESCRIPTION - FREQUENCY: FREQUENCY: CONTINUOUS

## 2021-06-26 ASSESSMENT — PAIN DESCRIPTION - ONSET: ONSET: SUDDEN

## 2021-06-26 ASSESSMENT — PAIN DESCRIPTION - ORIENTATION: ORIENTATION: RIGHT

## 2021-06-26 ASSESSMENT — PAIN DESCRIPTION - DESCRIPTORS: DESCRIPTORS: PATIENT UNABLE TO DESCRIBE

## 2021-06-26 ASSESSMENT — PAIN SCALES - GENERAL: PAINLEVEL_OUTOF10: 3

## 2021-06-26 ASSESSMENT — PAIN - FUNCTIONAL ASSESSMENT: PAIN_FUNCTIONAL_ASSESSMENT: ACTIVITIES ARE NOT PREVENTED

## 2021-06-26 ASSESSMENT — PAIN DESCRIPTION - PROGRESSION: CLINICAL_PROGRESSION: GRADUALLY WORSENING

## 2021-06-26 ASSESSMENT — PAIN DESCRIPTION - LOCATION: LOCATION: MOUTH

## 2021-06-26 NOTE — ED NOTES
Pt states \"he didn't get it all and it still hurts,\" requesting that the ED MD see him again before being discharged.       Mandy Redd RN  06/26/21 [id number]

## 2021-06-27 ASSESSMENT — ENCOUNTER SYMPTOMS
VOICE CHANGE: 0
TROUBLE SWALLOWING: 0
SORE THROAT: 0
RHINORRHEA: 0

## 2021-06-27 NOTE — ED PROVIDER NOTES
[facility]        Pt Name: Michael Rosales MRN: [id number]  Birthdate 1940  Date of evaluation: 6/26/2021  Provider: Emilie Mathews MD  PCP: Pamela Kulkarni MD  ED Attending: No att. providers found    [address] [place] [address]       Chief Complaint   Patient presents with    Oral Pain     canker sore on the right side of his mouth for 1 week, is not spreading into his jaw and ear. HISTORY OF PRESENT ILLNESS   (Location/Symptom, Timing/Onset, Context/Setting, Quality, Duration, Modifying Factors, Severity)  Note limiting factors. Michael Rosales is a 80 y.o. male who presents to the ED with a lesion on his tongue for the last week. Patient has a history of aphthous ulcers and has had this one for about a week. The pain is 8/10, sharp, worse with eating, radiating now into the jaw and ear area. Patient is worried that he has an infection, so presents to the ED. History is obtained from the patient. REVIEW OF SYSTEMS    (2-9 systems for level 4, 10 or more for level 5)     Review of Systems   Constitutional: Negative for chills and fever. HENT: Positive for ear pain and mouth sores. Negative for dental problem, drooling, rhinorrhea, sore throat, trouble swallowing and voice change. Skin: Negative for rash. Neurological: Negative for numbness and headaches. Positives and Pertinent negatives as per HPI. Except as noted above in the ROS, all other systems were reviewed and negative.        PAST MEDICAL HISTORY     Past Medical History:   Diagnosis Date    Benign prostatic disease     CAD (coronary artery disease)     CHF (congestive heart failure) (HCC)     Chronic fatigue     Hyperlipidemia     Kidney stones     Dr Cuong Cassidy, takes TheraLith XR supplement    Routine health maintenance     TAC 7/10 ; sees urologist routinely    Vitamin D deficiency disease          SURGICAL HISTORY     Past Surgical History: Procedure Laterality Date    CORONARY ANGIOPLASTY WITH STENT PLACEMENT  02/23/2010    XIENCE CHEIKH 3.0 x 23 pLAD    CYSTOSCOPY      stone removal    CYSTOSCOPY Right 2/11/2019    CYSTOSCOPY, URETEROSCOPY performed by Vito Palomares MD at [address]  03/18/2019    CYSTOSCOPY, [address] OF BLEEDING POINTS w. Dr Juan Pablo Manrique 3/18/19, [employer], Urolift System, Lot: [id number]    OTHER SURGICAL HISTORY  02/11/2019    cystoscopy; ureteroscopy    PROSTHET DEVICE APPLICATION N/A 6/10/7844    CYSTOSCOPY, UROLIFT WITH FULGERATION OF BLEEDING POINTS performed by Vito Palomares MD at [address] [place] [address], PARTIAL  [address] [place] [address]       Discharge Medication List as of 6/26/2021  4:48 PM      CONTINUE these medications which have NOT CHANGED    Details   Lysine 1000 MG TABS Take 3,000 mg by mouth once a weekHistorical Med      Psyllium (PSYLDEX PO) Take by mouthHistorical Med      Omega-3 Fatty Acids (FISH OIL) 1000 MG CAPS Take 3,000 mg by mouth 3 times dailyHistorical Med      Zinc 22.5 MG TABS Take 2 mLs by mouthHistorical Med      montelukast (SINGULAIR) 10 MG tablet Take 1 tablet by mouth daily, Disp-30 tablet,R-5Normal      zolpidem (AMBIEN) 10 MG tablet TAKE 1/2 TAB (5 MG) EVERY 2 HOURS FOR SLEEP X 4 DOSES, Disp-60 tablet, R-2Normal      diazePAM (VALIUM) 10 MG tablet Take 1 tablet by mouth nightly as needed (restless legs) for up to 90 days. , Disp-30 tablet, R-2Normal      l-arginine 1000 MG TABS Take 1,000 mg by mouth 2 times daily, Disp-30 tablet, R-3Normal      Magnesium 300 MG CAPS Take 400 mg by mouth Historical Med      hydrocortisone (PROCTO-MED HC) 2.5 % rectal cream PLACE RECTALLY TWICE DAILY, Disp-30 g, R-0, Normal      nitroGLYCERIN (NITROSTAT) 0.4 MG SL tablet Place 0.4 mg under the tongue every 5 minutes as needed for Chest pain up to max of 3 total doses. If no relief after 1 dose, call 911. Historical Med      aspirin 325 MG tablet Take 162 mg by mouth dailyHistorical Med      Spacer/Aero-Holding Chambers (POCKET SPACER) MIGUEL Disp-1 Device, R-1, NormalUse with inhaler      ECHINACEA PO Take by mouth      docusate sodium (COLACE) 100 MG capsule Take 100 mg by mouth daily      Ascorbic Acid (VITAMIN C) 500 MG tablet Take 500 mg by mouth daily Takes 4 tabs dailyHistorical Med      Cholecalciferol (VITAMIN D3) 1000 UNITS CAPS Take 4,000 Units by mouth daily, Disp-1 capsule, R-0               ALLERGIES     Anectine [succinylcholine], Lovastatin, Pravastatin, Simvastatin, and Tylenol [acetaminophen]    FAMILYHISTORY       Family History   Problem Relation Age of Onset    Heart Disease Mother     Colon Cancer Mother     Prostate Cancer Father [de-identified]          SOCIAL HISTORY       Social History     Socioeconomic History    Marital status:      Spouse name: None    Number of children: None    Years of education: None    Highest education level: None   Occupational History    None   Tobacco Use    Smoking status: Former Smoker     Packs/day: 1.00     Years: 10.00     Pack years: 10.00     Types: Cigarettes     Start date: 1954     Quit date: 1963     Years since quittin.5    Smokeless tobacco: Never Used    Tobacco comment: I haven't used tobacco in any form for over 50 years   Vaping Use    Vaping Use: Never used   Substance and Sexual Activity    Alcohol use: Yes     Alcohol/week: 2.0 standard drinks     Types: 1 Glasses of wine, 1 Cans of beer per week     Comment: I average 6 beers or glasses of wine per week.     Drug use: No    Sexual activity: Yes     Partners: Female   Other Topics Concern    None   Social History Narrative    None     Social Determinants of Health     Financial Resource Strain:     Difficulty of Paying Living Expenses:    Food Insecurity:     Worried About Running Out of Food in the Last Year:     [address] in the Last Year:    Transportation Needs:     Lack of Transportation He is alert and oriented to person, place, and time. Cranial Nerves: No cranial nerve deficit. DIAGNOSTIC RESULTS   LABS:    No results found for this visit on 06/26/21. All other labs were within normal range ornot returned as of this dictation. EKG: All EKG's are interpreted by the Emergency Department Physician who either signs or Co-signs this chart in the absence of a cardiologist.  Please see their note for interpretation of EKG. RADIOLOGY:   Non-plain film images such as CT, Ultrasound and MRI are read by the radiologist.  Plain radiographic images are visualized and preliminarily interpreted by the ED Provider with the belowfindings:    Interpretation per the Radiologist below, if available at the time of this note:    No orders to display         PROCEDURES   Unless otherwise noted below, none     Procedures    CRITICAL CARE TIME   N/A    CONSULTS:  None      EMERGENCY DEPARTMENT COURSE and DIFFERENTIAL DIAGNOSIS/MDM:   Vitals:    Vitals:    06/26/21 1545   BP: 104/84   Pulse: 52   Resp: 16   Temp: 98.1 °F (36.7 °C)   TempSrc: Oral   SpO2: 98%   Weight: 150 lb (68 kg)   Height: 5' 9\" (1.753 m)       Patient was given the following medications:  Medications - No data to display    Patient is a 80year old male with a history of aphthous ulcers presenting to the ED with one on his tongue. Patient was offered silver nitrate cautery to speed healing. He consented. Silver nitrate then used to cauterize the wound. Pain dropped to 2/10. Prior to being discharged patient looked at his wound in the mirror and saw some of the whitish mucous membrane surrounding the ulcer and thought we had missed some of the ulcer. Patient reassurred that the whitish area was cauterized as well as the ulcer, and has this appearance. Patient prescribed peridex mouth wash and counseled to avoid abrasive foods. Patient is agreeable with the plan for outpatient follow up.     Differential diagnosis included but was not limited to: aphthous ulcer, foreign body, thrust, leukoplakia, cancer, laceration, dental infection, ludwigs angina. The patient understands the importance of follow up and reasons to return. FINAL IMPRESSION      1. Aphthous ulcer          DISPOSITION/PLAN   DISPOSITION Decision To Discharge 06/26/2021 04:46:59 PM      PATIENT REFERRED TO:  Josephine Gitelman, [address] [address] [place] [address]   [phone]    Schedule an appointment as soon as possible for a visit in 1 week      [place] [address].  [facility] Emergency Department  [place] [address] [facility] [address]  [phone]  Go to   If symptoms worsen      DISCHARGE MEDICATIONS:  Discharge Medication List as of 6/26/2021  4:48 PM      START taking these medications    Details   chlorhexidine (PERIDEX) 0.12 % solution Take 15 mLs by mouth 2 times daily for 14 days, Disp-420 mL, R-0Normal             DISCONTINUED MEDICATIONS:  Discharge Medication List as of 6/26/2021  4:48 PM                 (Please note that portions of this note were completed with a voice recognition program.  Efforts were made to edit the dictations but occasionally words are mis-transcribed.)    Mulu Burr MD(electronically signed)              Mulu Burr MD  06/27/21 9477

## 2021-08-25 ENCOUNTER — TELEPHONE (OUTPATIENT)
Dept: CARDIOLOGY CLINIC | Age: 81
End: 2021-08-25

## 2021-08-25 NOTE — TELEPHONE ENCOUNTER
Ila Neville., 1940    Cardiac Risk Assessment    What type of procedure are you having? COLONOSCOPY    When is your procedure scheduled for?  9.2.21    Medications to be stopped. NONE REQUESTED-  PER GI OFFICE JUST NEEDS CARDIAC CLX    What physician is performing your procedure? DR. Aster Donis    Phone Number:   [phone]    Fax number to send the letter:   [phone]    Cardiologist:   Claus Hayes    Last Appointment:   4.7.21    Assessment:       Encounter Diagnoses   Name Primary?  Atherosclerosis of native coronary artery of native heart with stable angina pectoris ([facility] [address] Yes    Pure hypercholesterolemia      Chronic fatigue           Deacon Diaz was seen today for coronary artery disease, hyperlipidemia   Vit D Def  CAD with stable angina     CAD (coronary artery disease): sp stent to the LAD in 2010.      Hyperlipidemia: intolerant to statin therapy. Remains on fish oil does not want to take Zetia wants to do natural ways diet and supplements. Last lipids 6/23/20  Results reviewed in epic  Statin intolerance      Plan:  1. Medications reviewed. Refills not necessary only meds he takes is nitro which he has. 2. Labs TSH  3. Healthy life style and diet exercise  4. Follow up in 1 year     This note was scribed in the presence of Alcides Flor MD by Pablo Frazier RN.

## 2022-04-06 LAB
ALBUMIN SERPL-MCNC: 4.3 G/DL
ALP BLD-CCNC: 58 U/L
ALT SERPL-CCNC: 15 U/L
ANION GAP SERPL CALCULATED.3IONS-SCNC: NORMAL MMOL/L
AST SERPL-CCNC: 21 U/L
BILIRUB SERPL-MCNC: 0.6 MG/DL (ref 0.1–1.4)
BUN BLDV-MCNC: 13 MG/DL
CALCIUM SERPL-MCNC: 8.9 MG/DL
CHLORIDE BLD-SCNC: 105 MMOL/L
CHOLESTEROL, TOTAL: 177 MG/DL
CHOLESTEROL/HDL RATIO: NORMAL
CO2: 23 MMOL/L
CREAT SERPL-MCNC: 1.07 MG/DL
GFR CALCULATED: 70
GLUCOSE BLD-MCNC: 93 MG/DL
HDLC SERPL-MCNC: 59 MG/DL (ref 35–70)
LDL CHOLESTEROL CALCULATED: 104 MG/DL (ref 0–160)
MAGNESIUM: 2.2 MG/DL
NONHDLC SERPL-MCNC: NORMAL MG/DL
POTASSIUM SERPL-SCNC: 4.2 MMOL/L
PROSTATE SPECIFIC ANTIGEN: 1.8 NG/ML
SODIUM BLD-SCNC: 140 MMOL/L
TOTAL PROTEIN: 6.5
TRIGL SERPL-MCNC: 73 MG/DL
VITAMIN D 25-HYDROXY: 26.6
VITAMIN D2, 25 HYDROXY: NORMAL
VITAMIN D3,25 HYDROXY: NORMAL
VLDLC SERPL CALC-MCNC: 14 MG/DL

## 2022-04-11 ENCOUNTER — OFFICE VISIT (OUTPATIENT)
Dept: CARDIOLOGY CLINIC | Age: 82
End: 2022-04-11
Payer: MEDICARE

## 2022-04-11 VITALS
OXYGEN SATURATION: 98 % | BODY MASS INDEX: 23.25 KG/M2 | HEIGHT: 69 IN | DIASTOLIC BLOOD PRESSURE: 60 MMHG | SYSTOLIC BLOOD PRESSURE: 122 MMHG | WEIGHT: 157 LBS | HEART RATE: 50 BPM

## 2022-04-11 DIAGNOSIS — E78.00 PURE HYPERCHOLESTEROLEMIA: ICD-10-CM

## 2022-04-11 DIAGNOSIS — I25.10 ATHEROSCLEROSIS OF NATIVE CORONARY ARTERY OF NATIVE HEART WITHOUT ANGINA PECTORIS: ICD-10-CM

## 2022-04-11 DIAGNOSIS — R00.1 BRADYCARDIA: Primary | ICD-10-CM

## 2022-04-11 DIAGNOSIS — R53.82 CHRONIC FATIGUE: ICD-10-CM

## 2022-04-11 PROCEDURE — 1123F ACP DISCUSS/DSCN MKR DOCD: CPT | Performed by: INTERNAL MEDICINE

## 2022-04-11 PROCEDURE — 1036F TOBACCO NON-USER: CPT | Performed by: INTERNAL MEDICINE

## 2022-04-11 PROCEDURE — G8420 CALC BMI NORM PARAMETERS: HCPCS | Performed by: INTERNAL MEDICINE

## 2022-04-11 PROCEDURE — G8427 DOCREV CUR MEDS BY ELIG CLIN: HCPCS | Performed by: INTERNAL MEDICINE

## 2022-04-11 PROCEDURE — 99214 OFFICE O/P EST MOD 30 MIN: CPT | Performed by: INTERNAL MEDICINE

## 2022-04-11 PROCEDURE — 4040F PNEUMOC VAC/ADMIN/RCVD: CPT | Performed by: INTERNAL MEDICINE

## 2022-04-11 PROCEDURE — 93000 ELECTROCARDIOGRAM COMPLETE: CPT | Performed by: INTERNAL MEDICINE

## 2022-04-11 RX ORDER — EZETIMIBE 10 MG/1
10 TABLET ORAL DAILY
Qty: 30 TABLET | Refills: 5 | Status: SHIPPED | OUTPATIENT
Start: 2022-04-11 | End: 2022-04-22 | Stop reason: SINTOL

## 2022-04-11 RX ORDER — CALCIUM POLYCARBOPHIL 625 MG 625 MG/1
625 TABLET ORAL DAILY
COMMUNITY

## 2022-04-11 NOTE — PATIENT INSTRUCTIONS
1. Medications reviewed. 2. Recommend that you take Vit D 4,000 Units daily for a few days then go back to 3,000 daily. 3. Statin discussed due to hx of stent. He is intolerant  4. Recommend that you take Zetia 10 mg daily. It helps decrease the absorption of plaque.    5. Plan to follow 6 months

## 2022-04-11 NOTE — PROGRESS NOTES
[address] Office Note  4/11/2022     Subjective:  . Christine Padilla is here for cardiac follow up of his CAD LAD stent and hyperlipidemia. S/P cardiac cath 3/19/2020    HPI:  Today, reports feeling more tired with activity. He can do everything he wants to do but gets fatigued sooner. Patient with no complaints of chest pain, SOB, palpitations, dizziness, edema, or orthopnea/PND. PMH:  Patient previously seen Dr. Josie Head . He underwent cardiac cath in 2010 with stent placement to the LAD. He has been intolerant to statins and has been managed on Welchol. He reports to having myalgias, weakness, headaches and anuria with statin therapy. Welchol was then stopped. He is monitoring just diet and doing well. LDL 91 10.3. 19. Came to hospital 11/26/14 Patient reported moderate chest pain, radiation to his jaw and arm with associated SOB, nausea, diaphoresis. He reportedly took an 81 mg aspirin at home as well as 2 nitroglycerin. EMS squad arrived to find patient lightheaded with systolic blood pressure in the 80s. Blood pressure stabilized throughout transport to the emergency department. Upon arrival, patient denies any pain. Cardiologist Dr. More Garland seen and examined and had patient  transferred to [facility] where he underwent LHC 11/26/14 which revealed LM: normals LAD: previous proximal stent patent with 30-40% instent restenosis at distal stent edge. LCX: DOMINANT, luminals. Om1 with mid 20%, distal LCX/PDA with 10-20% RCA: small nonodominantLVEDP 7 LVEF 65%   He is statin intolerant  He is no longer taking welchol as it was causing muscle aches and monitors cholesterol thru Dr Liz Skelton. He came to ED 8/2/19 with complaints of aching chest pain relieved by NItro he ruled out for MI he left AMA and subsequently underwent plain GXT which was normal  He underwent cardiac cath on 3/19/2020 demonstrated patent LAD stent; non-obstructive CAD.       Review of Systems:  12 point ROS negative in all areas as listed below except as in [facility], pulmonary, GI, , Musculoskeletal, skin, neurological, hematological, endocrine, Psychiatric    Reviewed past medical history, social, and family history. Nonsmoker  Father: kidney and heart failure   at 80  Mother: heart disease  at 80 CHF  Smoke for 25 years     Past Medical History:   Diagnosis Date    Benign prostatic disease     CAD (coronary artery disease)     CHF (congestive heart failure) (HCC)     Chronic fatigue     Hyperlipidemia     Kidney stones     Dr Otoniel Montalvo, takes TheraLith XR supplement    Routine health maintenance     TAC 7/10 ; sees urologist routinely    Vitamin D deficiency disease      Past Surgical History:   Procedure Laterality Date    CORONARY ANGIOPLASTY WITH STENT PLACEMENT  2010    XIENCE CHEIKH 3.0 x 23 pLAD    CYSTOSCOPY      stone removal    CYSTOSCOPY Right 2019    CYSTOSCOPY, URETEROSCOPY performed by Hanna Og MD at [address] [place] [address]  2019    CYSTOSCOPY, [address] w. Dr Dom Barboza 3/18/19, [employer], Urolift System, Lot: [id number]   [place] OTHER SURGICAL HISTORY  2019    cystoscopy; ureteroscopy    PROSTHET DEVICE APPLICATION N/A     CYSTOSCOPY, UROLIFT WITH FULGERATION OF BLEEDING POINTS performed by Hanna Og MD at [address] [place] [address] Dr, PARTIAL  04    TONSILLECTOMY         Objective:   /60   Pulse 50   Ht 5' 9\" (1.753 m)   Wt 157 lb (71.2 kg)   SpO2 98%   BMI 23.18 kg/m²     Wt Readings from Last 3 Encounters:   22 157 lb (71.2 kg)   21 150 lb (68 kg)   21 154 lb 12.8 oz (70.2 kg)       Physical Exam:  General: No Respiratory distress, appears well developed and well nourished.    Eyes:  Sclera nonicteric  Nose/Sinuses:  negative findings: nose shows no deformity, asymmetry, or inflammation, nasal mucosa normal, septum midline with no perforation or bleeding  Back:  no pain to palpation  Joint:  no active joint inflammation  Musculoskeletal:  negative  Skin:  Warm and dry  Neck:  Negative for JVD and Carotid Bruits. Chest:  Clear to auscultation, respiration easy  Cardiovascular: RRR 52 bpm Bradycardiac 56-60/ min S2 normal, no murmur, no rub or thrill. Extremities:   No edema, No clubbing, cyanosis,  Pulses: Radials and pedal  pulses are normal.  Neuro: intact    Medications:   Outpatient Encounter Medications as of 4/11/2022   Medication Sig Dispense Refill    polycarbophil (FIBERCON) 625 MG tablet Take 625 mg by mouth daily      Cholecalciferol (VITAMIN D3) 25 MCG (1000 UT) CAPS Take 4,000 for a few days and then go back to 3,000. 1 capsule 0    ezetimibe (ZETIA) 10 MG tablet Take 1 tablet by mouth daily 30 tablet 5    Omega-3 Fatty Acids (FISH OIL) 1000 MG CAPS Take 3,000 mg by mouth 3 times daily      montelukast (SINGULAIR) 10 MG tablet Take 1 tablet by mouth daily 30 tablet 5    zolpidem (AMBIEN) 10 MG tablet TAKE 1/2 TAB (5 MG) EVERY 2 HOURS FOR SLEEP X 4 DOSES (Patient taking differently: 20 mg. TAKE 1/2 TAB (5 MG) EVERY 2 HOURS FOR SLEEP X 4 DOSES) 60 tablet 2    diazePAM (VALIUM) 10 MG tablet Take 1 tablet by mouth nightly as needed (restless legs) for up to 90 days. 30 tablet 2    Magnesium 300 MG CAPS Take 400 mg by mouth       hydrocortisone (PROCTO-MED HC) 2.5 % rectal cream PLACE RECTALLY TWICE DAILY 30 g 0    nitroGLYCERIN (NITROSTAT) 0.4 MG SL tablet Place 0.4 mg under the tongue every 5 minutes as needed for Chest pain up to max of 3 total doses. If no relief after 1 dose, call 911.       aspirin 325 MG tablet Take 162 mg by mouth daily      ECHINACEA PO Take by mouth      docusate sodium (COLACE) 100 MG capsule Take 100 mg by mouth daily      Ascorbic Acid (VITAMIN C) 500 MG tablet Take 500 mg by mouth daily Takes 4 tabs daily      [DISCONTINUED] Lysine 1000 MG TABS Take 3,000 mg by mouth once a week (Patient not taking: Reported on 4/11/2022)      [DISCONTINUED] Psyllium (PSYLDEX PO) Take by mouth      [DISCONTINUED] Zinc 22.5 MG TABS Take 2 mLs by mouth      [DISCONTINUED] l-arginine 1000 MG TABS Take 1,000 mg by mouth 2 times daily (Patient not taking: Reported on 4/11/2022) 30 tablet 3    Spacer/Aero-Holding Chambers (POCKET SPACER) MIGUEL Use with inhaler (Patient not taking: Reported on 4/11/2022) 1 Device 1    [DISCONTINUED] Cholecalciferol (VITAMIN D3) 1000 UNITS CAPS Take 4,000 Units by mouth daily (Patient taking differently: Take 800 Units by mouth daily ) 1 capsule 0     No facility-administered encounter medications on file as of 4/11/2022. Lab Data:  CBC: No results for input(s): WBC, HGB, HCT, MCV, PLT in the last 72 hours. BMP: No results for input(s): NA, K, CL, CO2, PHOS, BUN, CREATININE, CA in the last 72 hours. LIVER PROFILE: No results for input(s): AST, ALT, LIPASE, BILIDIR, BILITOT, ALKPHOS in the last 72 hours. Invalid input(s): AMYLASE,  ALB  LIPID:   No components found for: CHLPL    PT/INR: No results for input(s): PROTIME, INR in the last 72 hours. A1C:   No results found for: LABA1C  BNP:  No results for input(s): BNP in the last 72 hours.    Labs from Oct 2020 with Dr Odessa Clark reviewed   CBC CMP LIPID and Vit D normal except low Vit D    Lab Results   Component Value Date    CHOL 177 04/06/2022    CHOL 159 04/06/2021    CHOL 169 10/02/2020     Lab Results   Component Value Date    TRIG 73 04/06/2022    TRIG 73 04/06/2021    TRIG 78 10/02/2020     Lab Results   Component Value Date    HDL 59 04/06/2022    HDL 53 04/06/2021    HDL 50 10/02/2020     Lab Results   Component Value Date    LDLCALC 104 04/06/2022    LDLCALC 92 04/06/2021    LDLCALC 104 10/02/2020     Lab Results   Component Value Date    LABVLDL 15 06/23/2020    LABVLDL 13 03/19/2020    LABVLDL 18 10/03/2019    VLDL 14 04/06/2022    VLDL 14 04/06/2021    VLDL 15 10/02/2020     No results found for: CHOLHDLRATIO      IMAGING:   EKG 4.11.22  Sinus bradycardia otherwise within normal limits  CATH 3/19/20  LEFT HEART CATH  LM: normal  LAD: prox stent with mild restenosis 30%  LCX: Dominant,  Luminals                OM1- luminals                PDA- 20%  RCA: nondominant     LVEDP: 7  LVEF: 65%    EKG 3/19/20  Sinus bradycardia with Premature atrial complexesOtherwise normal ECGWhen compared with ECG of 02-AUG-2019 19:38,Premature atrial complexes are now PresentT wave inversion no longer evident in Inferior leadsConfirmed by Jewels Clinton ([id number]) on 3/19/2020 6:40:54 PM    EKG 2/25/20   Marked sinus  Bradycardia  - occasional PAC     # PACs = 1. Poor SNR (< 1) in leads  AVL   BORDERLINE RHYTHM    GXT stress test 8/16/19  Baseline resting EKG is normal.   Patient exercised on treadmill according to standard Jayjay protocol for 5 minutes and achieved 88% of predicted maximum heart rate. Normal BP response to stress. Occasional PVC during exercise. There was stress induced shortness of breath. There was stress induced fatigue. Symptoms resolved with rest.   No EKG changes of stress induced left ventricular ischemia. Ruiz treadmill score 5 indicates low risk. EKG 8/2/19  Sinus bradycardiaOtherwise normal ECGConfirmed by Agustina Barros MD, Hailey Carpenter ([id number]) on 8/3/2019 9:45:21 AM     CXR 8/2/19  FINDINGS: There is moderate gaseous distension of the stomach. No acute bony abnormality. The heart size and mediastinal contours are within normal limits, stable. The lungs are clear     Carotid US 10/18/16  The right internal carotid artery demonstrates 0-50% stenosis . The left internal carotid artery demonstrates 0-50% stenosis . Bilateral vertebral arteries are patent with flow in the normal direction. EKG 11/26/14  Sinus bradycardia with marked sinus arrhythmiaNo previous ECGs availableConfirmed by AMY NICHOLSON MD ([id number]) on 11/26/2014   CATH 11/26/14  Findings:     LM: normals  LAD: previous proximal stent patent with 30-40% instent restenosis at distal stent edge. LCX: DOMINANT, luminals.  Om1 with mid 20%, distal LCX/PDA with 10-20%  RCA: small nonodominant    LVEDP 7  LVEF 65%  Plan:  1. Patent LAD stent  2. Nonobstructive CAD    Assessment:  Encounter Diagnoses   Name Primary?  Pure hypercholesterolemia     Atherosclerosis of native coronary artery of native heart without angina pectoris     Chronic fatigue     Bradycardia Yes       [address] Rogelio Aponte was seen today for coronary artery disease, hyperlipidemia   Vit D Def    CAD (coronary artery disease): sp stent to the LAD in 2010. Hyperlipidemia: intolerant to statin therapy. Remains on fish oil does not want to take Zetia wants to do natural ways diet and supplements. He is willing to try   Last lipids in Dr Valencia's office 4.5.22   Vit D 26.6  Statin intolerance      Plan:  1. Medications reviewed. 2.  Currently on 3000 units a day but was taking 2000 units a day before that. Recommend that you take Vit D 4,000 Units daily for a few days then go back to 3,000 daily. 3. Statin discussed due to hx of stent. He is intolerant  4. Recommend that you take Zetia 10 mg daily. It helps decrease the absorption of cholesterol and keeps control on  plaque. 5.  EKG today  6. Plan to follow 6 months    QUALITY MEASURES  1. Tobacco Cessation Counseling: NA  2. Retake of BP if >140/90:   NA  3. Documentation to PCP/referring for new patient:  Sent to PCP at close of office visit  4. CAD patient on anti-platelet: Yes  5. CAD patient on STATIN therapy: Not unable to tolerate statin  6. Patient with CHF and aFib on anticoagulation:  NA     This note was scribed in the presence of Elmer Padilla MD by Cady Heath RN. I, Dr. Jacqueline Mena, personally performed the services described in this documentation, as scribed by the above signed scribe in my presence. It is both accurate and complete to my knowledge.  I agree with the details independently gathered by the clinical support staff, while the remaining scribed note accurately describes my personal service to the patient.         Sarah Peraza MD  [place] [id number]  [phone] [facility]  [phone] [facility]

## 2022-04-13 DIAGNOSIS — E78.00 PURE HYPERCHOLESTEROLEMIA: ICD-10-CM

## 2022-04-13 DIAGNOSIS — R53.82 CHRONIC FATIGUE: ICD-10-CM

## 2022-04-14 ENCOUNTER — TELEPHONE (OUTPATIENT)
Dept: CARDIOLOGY CLINIC | Age: 82
End: 2022-04-14

## 2022-04-14 NOTE — TELEPHONE ENCOUNTER
Created telephone encounter. Spoke with Campos relayed message per [facility] regarding labs. Pt verbalized understanding.

## 2022-04-22 ENCOUNTER — HOSPITAL ENCOUNTER (EMERGENCY)
Age: 82
Discharge: HOME | End: 2022-04-22
Attending: EMERGENCY MEDICINE
Payer: MEDICARE

## 2022-04-22 ENCOUNTER — TELEPHONE (OUTPATIENT)
Dept: CARDIOLOGY CLINIC | Age: 82
End: 2022-04-22

## 2022-04-22 ENCOUNTER — APPOINTMENT (OUTPATIENT)
Dept: GENERAL RADIOLOGY | Age: 82
End: 2022-04-22
Payer: MEDICARE

## 2022-04-22 VITALS
OXYGEN SATURATION: 99 % | SYSTOLIC BLOOD PRESSURE: 146 MMHG | TEMPERATURE: 98.1 F | HEART RATE: 58 BPM | DIASTOLIC BLOOD PRESSURE: 81 MMHG

## 2022-04-22 DIAGNOSIS — M54.31 SCIATICA OF RIGHT SIDE: Primary | ICD-10-CM

## 2022-04-22 PROCEDURE — 6370000000 HC RX 637 (ALT 250 FOR IP): Performed by: EMERGENCY MEDICINE

## 2022-04-22 PROCEDURE — 99283 EMERGENCY DEPT VISIT LOW MDM: CPT

## 2022-04-22 PROCEDURE — 72100 X-RAY EXAM L-S SPINE 2/3 VWS: CPT

## 2022-04-22 RX ORDER — HYDROCODONE BITARTRATE AND ACETAMINOPHEN 5; 325 MG/1; MG/1
1 TABLET ORAL EVERY 6 HOURS PRN
Qty: 12 TABLET | Refills: 0 | Status: SHIPPED | OUTPATIENT
Start: 2022-04-22 | End: 2022-04-25

## 2022-04-22 RX ORDER — HYDROCODONE BITARTRATE AND ACETAMINOPHEN 5; 325 MG/1; MG/1
1 TABLET ORAL ONCE
Status: COMPLETED | OUTPATIENT
Start: 2022-04-22 | End: 2022-04-22

## 2022-04-22 RX ORDER — IBUPROFEN 600 MG/1
600 TABLET ORAL ONCE
Status: COMPLETED | OUTPATIENT
Start: 2022-04-22 | End: 2022-04-22

## 2022-04-22 RX ORDER — DIAZEPAM 5 MG/1
5 TABLET ORAL ONCE
Status: COMPLETED | OUTPATIENT
Start: 2022-04-22 | End: 2022-04-22

## 2022-04-22 RX ADMIN — IBUPROFEN 600 MG: 600 TABLET ORAL at 16:30

## 2022-04-22 RX ADMIN — DIAZEPAM 5 MG: 5 TABLET ORAL at 16:30

## 2022-04-22 RX ADMIN — HYDROCODONE BITARTRATE AND ACETAMINOPHEN 1 TABLET: 5; 325 TABLET ORAL at 18:13

## 2022-04-22 ASSESSMENT — PAIN DESCRIPTION - DESCRIPTORS
DESCRIPTORS: ACHING
DESCRIPTORS: ACHING

## 2022-04-22 ASSESSMENT — PAIN SCALES - GENERAL
PAINLEVEL_OUTOF10: 9
PAINLEVEL_OUTOF10: 7

## 2022-04-22 ASSESSMENT — PAIN - FUNCTIONAL ASSESSMENT
PAIN_FUNCTIONAL_ASSESSMENT: NONE - DENIES PAIN
PAIN_FUNCTIONAL_ASSESSMENT: 0-10

## 2022-04-22 ASSESSMENT — PAIN DESCRIPTION - ONSET: ONSET: GRADUAL

## 2022-04-22 ASSESSMENT — PAIN DESCRIPTION - ORIENTATION
ORIENTATION: RIGHT
ORIENTATION: RIGHT

## 2022-04-22 ASSESSMENT — PAIN DESCRIPTION - LOCATION
LOCATION: LEG
LOCATION: LEG

## 2022-04-22 ASSESSMENT — PAIN DESCRIPTION - FREQUENCY: FREQUENCY: CONTINUOUS

## 2022-04-22 ASSESSMENT — PAIN DESCRIPTION - PAIN TYPE: TYPE: ACUTE PAIN

## 2022-04-22 NOTE — ED PROVIDER NOTES
[address] [facility]      Pt Name: Kristine Medrano. MRN: [id number]  Birthdate 1940  Date of evaluation: 4/22/2022  Provider: Vanessa Beckford MD    CHIEF COMPLAINT       Chief Complaint   Patient presents with    Medication Reaction     pt states he recently started taking Exetimibe, having muscle cramps. HISTORY OF PRESENT ILLNESS   (Location/Symptom, Timing/Onset, Context/Setting, Quality, Duration, Modifying Factors, Severity)  Note limiting factors. Kristine Randolph is a [de-identified] y.o. male with past medical history of multiple medical comorbidities here today with right leg pain. The patient states that over the course last few days he has had a sharp stabbing pain starting in his right buttocks radiating down the posterior right leg over to the dorsum of his right foot. No numbness or tingling. No weakness in extremity. No saddle anesthesia. Denies low back pain. No trauma or injury. He states this occurred shortly after taking Zetia for the first time and is curious if it could be related. He denies any fevers or chills. No trauma or injury. [facility]    Nursing Notes were reviewed. REVIEW OF SYSTEMS    (2-9 systems for level 4, 10 or more for level 5)     Review of Systems    Please see HPI for pertinent positive and negative review of system findings. A full 10 system ROS was performed and otherwise negative.         PAST MEDICAL HISTORY     Past Medical History:   Diagnosis Date    Benign prostatic disease     CAD (coronary artery disease)     CHF (congestive heart failure) (HCC)     Chronic fatigue     Hyperlipidemia     Kidney stones     Dr Alexa Beasley, takes TheraLith XR supplement    Routine health maintenance     TAC 7/10 ; sees urologist routinely    Vitamin D deficiency disease          SURGICAL HISTORY       Past Surgical History:   Procedure Laterality Date    CORONARY ANGIOPLASTY WITH STENT PLACEMENT  02/23/2010    XIENCE CHEIKH 3.0 x 23 pLAD    CYSTOSCOPY      stone removal    CYSTOSCOPY Right 2/11/2019    CYSTOSCOPY, URETEROSCOPY performed by Susy Merlin, MD at [facility]  03/18/2019    CYSTOSCOPY, [address] 3/18/19, [employer], U[employer]ift System, Lot: [id number]    OTHER SURGICAL HISTORY  02/11/2019    cystoscopy; ureteroscopy    PROSTHET DEVICE APPLICATION N/A 8/60/1101    CYSTOSCOPY, UROLIFT WITH FULGERATION OF BLEEDING POINTS performed by Susy Merlin, MD at [address] [place] [address] Dr, PARTIAL  04    TONSILLECTOMY           CURRENT MEDICATIONS       Previous Medications    ASCORBIC ACID (VITAMIN C) 500 MG TABLET    Take 500 mg by mouth daily Takes 4 tabs daily    ASPIRIN 325 MG TABLET    Take 162 mg by mouth daily    CHOLECALCIFEROL (VITAMIN D3) 25 MCG (1000 UT) CAPS    Take 4,000 for a few days and then go back to 3,000. DIAZEPAM (VALIUM) 10 MG TABLET    Take 1 tablet by mouth nightly as needed (restless legs) for up to 90 days. DOCUSATE SODIUM (COLACE) 100 MG CAPSULE    Take 100 mg by mouth daily    ECHINACEA PO    Take by mouth    HYDROCORTISONE (PROCTO-MED HC) 2.5 % RECTAL CREAM    PLACE RECTALLY TWICE DAILY    MAGNESIUM 300 MG CAPS    Take 400 mg by mouth     MONTELUKAST (SINGULAIR) 10 MG TABLET    Take 1 tablet by mouth daily    NITROGLYCERIN (NITROSTAT) 0.4 MG SL TABLET    Place 0.4 mg under the tongue every 5 minutes as needed for Chest pain up to max of 3 total doses. If no relief after 1 dose, call 911.     OMEGA-3 FATTY ACIDS (FISH OIL) 1000 MG CAPS    Take 3,000 mg by mouth 3 times daily    POLYCARBOPHIL (FIBERCON) 625 MG TABLET    Take 625 mg by mouth daily    SPACER/AERO-HOLDING CHAMBERS (POCKET SPACER) MIGUEL    Use with inhaler    ZOLPIDEM (AMBIEN) 10 MG TABLET    TAKE 1/2 TAB (5 MG) EVERY 2 HOURS FOR SLEEP X 4 DOSES       ALLERGIES     Zetia [ezetimibe], Anectine [succinylcholine], Dilaudid [hydromorphone hcl], Lovastatin, Pravastatin, Simvastatin, and Tylenol [acetaminophen]    FAMILY HISTORY       Family History   Problem Relation Age of Onset    Heart Disease Mother     Colon Cancer Mother     Prostate Cancer Father [de-identified]          SOCIAL HISTORY       Social History     Socioeconomic History    Marital status:      Spouse name: None    Number of children: None    Years of education: None    Highest education level: None   Occupational History    None   Tobacco Use    Smoking status: Former Smoker     Packs/day: 1.00     Years: 10.00     Pack years: 10.00     Types: Cigarettes     Start date: 1954     Quit date: 1963     Years since quittin.4    Smokeless tobacco: Never Used    Tobacco comment: I haven't used tobacco in any form for over 50 years   Vaping Use    Vaping Use: Never used   Substance and Sexual Activity    Alcohol use: Yes     Alcohol/week: 2.0 standard drinks     Types: 1 Glasses of wine, 1 Cans of beer per week     Comment: I average 6 beers or glasses of wine per week.  Drug use: No    Sexual activity: Yes     Partners: Female   Other Topics Concern    None   Social History Narrative    None     Social Determinants of Health     Financial Resource Strain:     Difficulty of Paying Living Expenses: Not on file   Food Insecurity:     Worried About Running Out of Food in the Last Year: Not on file    Marcelle of Food in the Last Year: Not on file   Transportation Needs:     Lack of Transportation (Medical): Not on file    Lack of Transportation (Non-Medical):  Not on file   Physical Activity:     Days of Exercise per Week: Not on file    Minutes of Exercise per Session: Not on file   Stress:     Feeling of Stress : Not on file   Social Connections:     Frequency of Communication with Friends and Family: Not on file    Frequency of Social Gatherings with Friends and Family: Not on file    Attends [employer] Services: Not on file    Active Member of Clubs or Organizations: Not on file   [facility] Attends Club or Organization Meetings: Not on file    Marital Status: Not on file   Intimate Partner Violence:     Fear of Current or Ex-Partner: Not on file    Emotionally Abused: Not on file    Physically Abused: Not on file    Sexually Abused: Not on file   Housing Stability:     Unable to Pay for Housing in the Last Year: Not on file    Number of [place] in the Last Year: Not on file    Unstable Housing in the Last Year: Not on file       SCREENINGS               PHYSICAL EXAM    (up to 7 for level 4, 8 or more for level 5)     ED Triage Vitals [04/22/22 1605]   BP Temp Temp Source Pulse Resp SpO2 Height Weight   (!) 156/94 98.1 °F (36.7 °C) Oral 60 -- 98 % -- --       Physical Exam      General appearance:  Cooperative. No acute distress. Skin:  Warm. Dry. Ears, nose, mouth and throat:  Oral mucosa moist,  Perfusion:  intact with 1+ bilateral and equal dorsalis pedis pulses  Respiratory: Respirations nonlabored. Neurological:  Alert. Moves all extremities spontaneously. Intact sensation throughout the entire right lower extremity with 2+ right patellar and Achilles deep tendon reflexes. No problems with dorsiflexion of the right great toe. No saddle anesthesia  Musculoskeletal:   Normal ROM, no deformities. No lumbar spine tenderness. Tenderness to palpation to the right buttocks just lateral to the sacrum medial to the greater trochanter. Normal flexion extension of the right hip, knee and ankle. No bony tenderness in the right lower extremity. Psychiatric:  Normal mood      DIAGNOSTIC RESULTS       Labs Reviewed - No data to display    Interpretation per the Radiologist below, if obtained/available at the time of this note:    XR LUMBAR SPINE (2-3 VIEWS)   Final Result   1. Multilevel degenerative disc disease and facet joint arthropathy   2.  No acute lumbar spine abnormality             All other labs/imaging were within normal range or not returned as of this dictation. EMERGENCY DEPARTMENT COURSE and DIFFERENTIAL DIAGNOSIS/MDM:   Vitals:    Vitals:    04/22/22 1605   BP: (!) 156/94   Pulse: 60   Temp: 98.1 °F (36.7 °C)   TempSrc: Oral   SpO2: 98%       Patient presents emergency department today with right-sided leg pain likely consistent with sciatica or lumbar radiculopathy. His strength is intact. His reflexes were normal.  No saddle anesthesia or incontinence. No reports of trauma. X-rays confirm advanced degenerative changes. Patient treated with NSAIDs and muscle relaxants and has had improvement. Was also given Norco as he took 1 of these earlier in the morning and that seemed to help his pain. Will be given a prescription for this as he is otherwise out of a prescription he had from 2015. Low suspicion for cauda equina. Did not feel CT or MRI necessary. Plan to discharge home    MDM    CONSULTS     None    Critical Care:   None    REASSESSMENT          PROCEDURE     Unless otherwise noted below, none     Procedures      FINAL IMPRESSION      1. Sciatica of right side            DISPOSITION/PLAN   DISPOSITION Decision To Discharge 04/22/2022 05:57:36 PM        PATIENT REFERRED TO:  Maura Simon, [place] [address] [place] [address]   [phone]    Schedule an appointment as soon as possible for a visit         DISCHARGE MEDICATIONS:  New Prescriptions    HYDROCODONE-ACETAMINOPHEN (NORCO) 5-325 MG PER TABLET    Take 1 tablet by mouth every 6 hours as needed for Pain for up to 3 days. Intended supply: 3 days. Take lowest dose possible to manage pain     Controlled Substances Monitoring:     RX Monitoring 9/12/2018   Attestation The Prescription Monitoring Report for this patient was reviewed today. Periodic Controlled Substance Monitoring No signs of potential drug abuse or diversion identified.        (Please note that portions of this note were completed with a voice recognition program.  Efforts were made to edit the dictations but occasionally words are mis-transcribed.)    Talia Rushing MD (electronically signed)  Attending Emergency Physician            Anh Cordova MD  04/22/22 9436

## 2022-04-22 NOTE — TELEPHONE ENCOUNTER
Wife called and spoke with Nadeem Cordoba. I called wife back. She states that he took Zetia for 6 days. His last dose was Monday. He stopped it due to severe muscle pain. He is still having muscle pain and can hardly get out of bed. He uses a cane when he does. I told wife to have him stop Zetia and to call his pcp, as they were wanting pain medication, to get his input.       I put Zetia on ALLERGY LIST

## 2022-04-22 NOTE — TELEPHONE ENCOUNTER
Called patient back. He notes that he spoke with pcp office. He did take an old pain medication that did not help. They told him that if that did not help to go to ED. I also verbalized to him that he should go to ED. Norris MIRANDA.

## 2022-10-14 LAB
CHOLESTEROL, TOTAL: 154 MG/DL
CHOLESTEROL/HDL RATIO: NORMAL
HDLC SERPL-MCNC: 42 MG/DL (ref 35–70)
LDL CHOLESTEROL CALCULATED: 89 MG/DL (ref 0–160)
NONHDLC SERPL-MCNC: NORMAL MG/DL
TRIGL SERPL-MCNC: 126 MG/DL
VITAMIN D 25-HYDROXY: 33.7
VITAMIN D2, 25 HYDROXY: NORMAL
VITAMIN D3,25 HYDROXY: NORMAL
VLDLC SERPL CALC-MCNC: 23 MG/DL

## 2022-10-27 NOTE — PROGRESS NOTES
[address] Office Note  10/31/2022     Subjective:  Mr. Hoa Decker is here for cardiac follow up of his CAD LAD stent and hyperlipidemia. S/P cardiac cath 3/19/2020  C/o pain from left testicle into abdomen    HPI:  Today, his wife is present. He reports his pcp started him on a medication for gas which has helped his chest pressure. He feels good. He is able to do all of his daily activities. 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 He denies excessive fatigue and SOB. If he gets tired or SOB he sits down and takes a break. Last night he started having pain from left testicle and it radiates a shooting pain into his abdomen. He denies any swelling to testicle. Patient denies current edema, chest pain, sob, palpitations, dizziness or syncope. Patient is taking all cardiac medications as prescribed and tolerates them well. Patient is vaccinated against Covid. Pfizer 3/3    PMH:  Patient previously seen Dr. Gisell Barragan . He underwent cardiac cath in 2010 with stent placement to the LAD. He has been intolerant to statins and has been managed on Welchol. He reports to having myalgias, weakness, headaches and anuria with statin therapy. Welchol was then stopped. He is monitoring just diet and doing well. LDL 91 10.3. 19. Came to hospital 11/26/14 Patient reported moderate chest pain, radiation to his jaw and arm with associated SOB, nausea, diaphoresis. He reportedly took an 81 mg aspirin at home as well as 2 nitroglycerin. EMS squad arrived to find patient lightheaded with systolic blood pressure in the 80s. Blood pressure stabilized throughout transport to the emergency department. Upon arrival, patient denies any pain. Cardiologist Dr. Santana Jacobs seen and examined and had patient  transferred to [facility] where he underwent LHC 11/26/14 which revealed LM: normals LAD: previous proximal stent patent with 30-40% instent restenosis at distal stent edge. LCX: DOMINANT, luminals.  Om1 with mid 20%, distal LCX/PDA with 10-20% RCA: small nonodominantLVEDP 7 LVEF 65%   He is statin intolerant  He is no longer taking welchol as it was causing muscle aches and monitors cholesterol thru Dr Jen Miguel. He came to ED 19 with complaints of aching chest pain relieved by NItro he ruled out for MI he left AMA and subsequently underwent plain GXT which was normal  He underwent cardiac cath on 3/19/2020 demonstrated patent LAD stent; non-obstructive CAD. Review of Systems:  12 point ROS negative in all areas as listed below except as in [address], pulmonary, GI, , Musculoskeletal, skin, neurological, hematological, endocrine, Psychiatric    Reviewed past medical history, social, and family history.  Nonsmoker  Father: kidney and heart failure   at 80  Mother: heart disease  at 80 CHF  Smoke for 25 years     Past Medical History:   Diagnosis Date    Benign prostatic disease     CAD (coronary artery disease)     CHF (congestive heart failure) ([facility] [address] [address]     Chronic fatigue     Hyperlipidemia     Kidney stones     Dr Charo Rubio, takes TheraLith XR supplement    Routine health maintenance     TAC 7/10 ; sees urologist routinely    Vitamin D deficiency disease      Past Surgical History:   Procedure Laterality Date    CORONARY ANGIOPLASTY WITH STENT PLACEMENT  2010    XIENCE CHEIKH 3.0 x 23 pLAD    CYSTOSCOPY      stone removal    CYSTOSCOPY Right 2019    CYSTOSCOPY, URETEROSCOPY performed by Richard Luna MD at [address] [facility]  2019    CYSTOSCOPY, [address] Dr Marcela Vincent 3/18/19, [employer], Urolift System, Lot: [id number]    OTHER SURGICAL HISTORY  2019    cystoscopy; ureteroscopy    PROSTHET DEVICE APPLICATION N/A 2530    CYSTOSCOPY, UROLIFT WITH FULGERATION OF BLEEDING POINTS performed by Richard Luna MD at [address] [facility] [facility], PARTIAL  04    TONSILLECTOMY         Objective:   /71   Pulse 51   Ht 5' 9\" (1.753 m)   Wt 157 lb (71.2 kg)   SpO2 97%   BMI 23.18 kg/m²     Wt Readings from Last 3 Encounters:   10/31/22 157 lb (71.2 kg)   04/11/22 157 lb (71.2 kg)   06/26/21 150 lb (68 kg)       Physical Exam:  General: No Respiratory distress, appears well developed and well nourished. Eyes:  Sclera nonicteric  Nose/Sinuses:  negative findings: nose shows no deformity, asymmetry, or inflammation, nasal mucosa normal, septum midline with no perforation or bleeding  Back:  no pain to palpation  Joint:  no active joint inflammation  Musculoskeletal:  negative  Skin:  Warm and dry  Neck:  Negative for JVD and Carotid Bruits. Chest:  Clear to auscultation, respiration easy  Cardiovascular: RRR 52 bpm S2 normal, no murmur, no rub or thrill. Extremities:   No edema, No clubbing, cyanosis,  Pulses: Radials and pedal  pulses are normal.  Neuro: intact    Medications:   Outpatient Encounter Medications as of 10/31/2022   Medication Sig Dispense Refill    pantoprazole (PROTONIX) 40 MG tablet       zinc sulfate 1 MG/ML injection Place 1 mg into the trachea daily      polycarbophil (FIBERCON) 625 MG tablet Take 625 mg by mouth daily      Cholecalciferol (VITAMIN D3) 25 MCG (1000 UT) CAPS Take 4,000 for a few days and then go back to 3,000. 1 capsule 0    Omega-3 Fatty Acids (FISH OIL) 1000 MG CAPS Take 3,000 mg by mouth 3 times daily      montelukast (SINGULAIR) 10 MG tablet Take 1 tablet by mouth daily 30 tablet 5    zolpidem (AMBIEN) 10 MG tablet TAKE 1/2 TAB (5 MG) EVERY 2 HOURS FOR SLEEP X 4 DOSES (Patient taking differently: Take 20 mg by mouth nightly as needed. TAKE 30 mg qd) 60 tablet 2    diazePAM (VALIUM) 10 MG tablet Take 1 tablet by mouth nightly as needed (restless legs) for up to 90 days.  30 tablet 2    Magnesium 300 MG CAPS Take 400 mg by mouth       hydrocortisone (PROCTO-MED HC) 2.5 % rectal cream PLACE RECTALLY TWICE DAILY 30 g 0    nitroGLYCERIN (NITROSTAT) 0.4 MG SL tablet Place 0.4 mg under the tongue every 5 minutes as needed for Chest pain up to max of 3 total doses. If no relief after 1 dose, call 911.      aspirin 325 MG tablet Take 162 mg by mouth daily      Spacer/Aero-Holding Chambers (POCKET SPACER) MIGUEL Use with inhaler 1 Device 1    ECHINACEA PO Take by mouth      docusate sodium (COLACE) 100 MG capsule Take 100 mg by mouth daily      Ascorbic Acid (VITAMIN C) 500 MG tablet Take 500 mg by mouth daily Takes 4 tabs daily       No facility-administered encounter medications on file as of 10/31/2022. Lab Data:  CBC: No results for input(s): WBC, HGB, HCT, MCV, PLT in the last 72 hours. BMP: No results for input(s): NA, K, CL, CO2, PHOS, BUN, CREATININE, CA in the last 72 hours. LIVER PROFILE: No results for input(s): AST, ALT, LIPASE, BILIDIR, BILITOT, ALKPHOS in the last 72 hours. Invalid input(s): AMYLASE,  ALB  LIPID:   No components found for: CHLPL    PT/INR: No results for input(s): PROTIME, INR in the last 72 hours. A1C:   No results found for: LABA1C  BNP:  No results for input(s): BNP in the last 72 hours. Labs from Oct 2020 with Dr Haven Alegria reviewed   CBC CMP LIPID and Vit D normal except low Vit D    Lab Results   Component Value Date    CHOL 154 10/14/2022    CHOL 177 04/06/2022    CHOL 159 04/06/2021     Lab Results   Component Value Date    TRIG 126 10/14/2022    TRIG 73 04/06/2022    TRIG 73 04/06/2021     Lab Results   Component Value Date    HDL 42 10/14/2022    HDL 59 04/06/2022    HDL 53 04/06/2021     Lab Results   Component Value Date    LDLCALC 89 10/14/2022    LDLCALC 104 04/06/2022    LDLCALC 92 04/06/2021     Lab Results   Component Value Date    LABVLDL 15 06/23/2020    LABVLDL 13 03/19/2020    LABVLDL 18 10/03/2019    VLDL 23 10/14/2022    VLDL 14 04/06/2022    VLDL 14 04/06/2021     No results found for: CHOLHDLRATIO      IMAGING:   EKG 10.13.22 sinus bradycardia done at Dr Valencia's office copy scanned in epic   EKG 4/14/22  Marked sinus  Bradycardia   Low voltage in limb leads.      EKG 4. 11.22  Sinus bradycardia otherwise within normal limits    CATH 3/19/20  LEFT HEART CATH  LM: normal  LAD: prox stent with mild restenosis 30%  LCX: Dominant,  Luminals                OM1- luminals                PDA- 20%  RCA: nondominant     LVEDP: 7  LVEF: 65%    EKG 3/19/20  Sinus bradycardia with Premature atrial complexesOtherwise normal ECGWhen compared with ECG of 02-AUG-2019 19:38,Premature atrial complexes are now PresentT wave inversion no longer evident in Inferior leadsConfirmed by Jennifer Madrid ([id number]) on 3/19/2020 6:40:54 PM    EKG 2/25/20   Marked sinus  Bradycardia  - occasional PAC     # PACs = 1. Poor SNR (< 1) in leads  AVL   BORDERLINE RHYTHM    GXT stress test 8/16/19  Baseline resting EKG is normal.   Patient exercised on treadmill according to standard Jayjay protocol for 5 minutes and achieved 88% of predicted maximum heart rate. Normal BP response to stress. Occasional PVC during exercise. There was stress induced shortness of breath. There was stress induced fatigue. Symptoms resolved with rest.   No EKG changes of stress induced left ventricular ischemia. Ruiz treadmill score 5 indicates low risk. EKG 8/2/19  Sinus bradycardiaOtherwise normal ECGConfirmed by Gerson Angela MD, Demaris Severin ([id number]) on 8/3/2019 9:45:21 AM     CXR 8/2/19  FINDINGS: There is moderate gaseous distension of the stomach. No acute bony abnormality. The heart size and mediastinal contours are within normal limits, stable. The lungs are clear     Carotid US 10/18/16  The right internal carotid artery demonstrates 0-50% stenosis . The left internal carotid artery demonstrates 0-50% stenosis . Bilateral vertebral arteries are patent with flow in the normal direction.     EKG 11/26/14  Sinus bradycardia with marked sinus arrhythmiaNo previous ECGs availableConfirmed by AMY Vincent MD ([id number]) on 11/26/2014   CATH 11/26/14  Findings:     LM: normals  LAD: previous proximal stent patent with 30-40% instent restenosis at distal stent edge. LCX: DOMINANT, luminals. Om1 with mid 20%, distal LCX/PDA with 10-20%  RCA: small nonodominant    LVEDP 7  LVEF 65%  Plan:  1. Patent LAD stent  2. Nonobstructive CAD    Assessment:  Encounter Diagnoses   Name Primary? Atherosclerosis of native coronary artery of native heart without angina pectoris Yes    Pure hypercholesterolemia     Other fatigue     SOB (shortness of breath)          [facility] was seen today for coronary artery disease, hyperlipidemia   Vit D Def    CAD (coronary artery disease): sp stent to the LAD in 2010. No angina continue asa  fish oil    Hyperlipidemia: intolerant to statin therapy. Remains on fish oil does not want to take Zetia wants to do natural ways diet and supplements. Last lipids in Dr Valencia's office 4.5.22   Vit D 26.6  Statin discussed due to hx of stent. Statin intolerance  Recommend that you take Vit D 4,000 Units daily for a few days then go back to 3,000 daily. Plan:  Current epic labs reviewed with patient and his wife  Current medications reviewed. Refills given as warranted. I recommend getting the new Covid booster  No cardiac testing at this time. I recommend going to see Juanita Moody MD for the pain in your testicle. Follow up with me in 1 year or sooner if you need    This note is scribed in the presence of Dr. Alia Orellana MD by Zeenat Ashraf RN.    I, Dr. Gerardo Skinner, personally performed the services described in this documentation, as scribed by the above signed scribe in my presence. It is both accurate and complete to my knowledge. I agree with the details independently gathered by the clinical support staff, while the remaining scribed note accurately describes my personal service to the patient. QUALITY MEASURES  1. Tobacco Cessation Counseling: NA  2. Retake of BP if >140/90:   NA  3. Documentation to PCP/referring for new patient:  Sent to PCP at close of office visit  4.  CAD patient on anti-platelet: Yes  5. CAD patient on STATIN therapy: Not unable to tolerate statin  6.  Patient with CHF and aFib on anticoagulation:  TRENT Ceballos MD  [facility], [place]  [phone] [facility]  [phone] [facility]

## 2022-10-31 ENCOUNTER — OFFICE VISIT (OUTPATIENT)
Dept: CARDIOLOGY CLINIC | Age: 82
End: 2022-10-31
Payer: MEDICARE

## 2022-10-31 VITALS
SYSTOLIC BLOOD PRESSURE: 123 MMHG | WEIGHT: 157 LBS | OXYGEN SATURATION: 97 % | DIASTOLIC BLOOD PRESSURE: 71 MMHG | HEART RATE: 51 BPM | BODY MASS INDEX: 23.25 KG/M2 | HEIGHT: 69 IN

## 2022-10-31 DIAGNOSIS — R00.1 SINUS BRADYCARDIA: ICD-10-CM

## 2022-10-31 DIAGNOSIS — I25.10 ATHEROSCLEROSIS OF NATIVE CORONARY ARTERY OF NATIVE HEART WITHOUT ANGINA PECTORIS: Primary | ICD-10-CM

## 2022-10-31 DIAGNOSIS — E78.00 PURE HYPERCHOLESTEROLEMIA: ICD-10-CM

## 2022-10-31 PROBLEM — R06.02 SOB (SHORTNESS OF BREATH): Status: ACTIVE | Noted: 2022-10-31

## 2022-10-31 PROCEDURE — G8484 FLU IMMUNIZE NO ADMIN: HCPCS | Performed by: INTERNAL MEDICINE

## 2022-10-31 PROCEDURE — 99214 OFFICE O/P EST MOD 30 MIN: CPT | Performed by: INTERNAL MEDICINE

## 2022-10-31 PROCEDURE — 1123F ACP DISCUSS/DSCN MKR DOCD: CPT | Performed by: INTERNAL MEDICINE

## 2022-10-31 PROCEDURE — G8420 CALC BMI NORM PARAMETERS: HCPCS | Performed by: INTERNAL MEDICINE

## 2022-10-31 PROCEDURE — G8427 DOCREV CUR MEDS BY ELIG CLIN: HCPCS | Performed by: INTERNAL MEDICINE

## 2022-10-31 PROCEDURE — 1036F TOBACCO NON-USER: CPT | Performed by: INTERNAL MEDICINE

## 2022-10-31 RX ORDER — PANTOPRAZOLE SODIUM 40 MG/1
TABLET, DELAYED RELEASE ORAL
COMMUNITY
Start: 2022-10-13

## 2022-10-31 RX ORDER — ZINC SULFATE 1 MG/ML
1 INJECTION, SOLUTION INTRAVENOUS DAILY
COMMUNITY

## 2022-10-31 NOTE — LETTER
[facility] [address]  Phone: [phone]  Fax: [phone]    Krissy Cervantes MD    November 1, 2022     Shukri Ahmetnicholas, [address] ,[place] [id number]    Patient: Ana More   MR Number: [id number]   YOB: 1940   Date of Visit: 10/31/2022       Dear Jeffrey BREWER:    Thank you for referring Ketty Burr to me for evaluation/treatment. Below are the relevant portions of my assessment and plan of care. If you have questions, please do not hesitate to call me. I look forward to following Lyly Silva along with you.     Sincerely,      Krissy Cervantes MD

## 2022-10-31 NOTE — PATIENT INSTRUCTIONS
Plan:  Current epic labs reviewed with patient and his wife  Current medications reviewed. Refills given as warranted. I recommend getting the new Covid booster  No cardiac testing at this time. I recommend going to see Terry Verdugo MD for the pain in your testicle.       Follow up with me in 1 year or sooner if you need

## 2023-10-24 LAB
ALBUMIN SERPL-MCNC: 4.3 G/DL
ALBUMIN SERPL-MCNC: 4.3 G/DL
ALP BLD-CCNC: 58 U/L
ALP BLD-CCNC: 58 U/L
ALT SERPL-CCNC: 15 U/L
ALT SERPL-CCNC: 15 U/L
ANION GAP SERPL CALCULATED.3IONS-SCNC: 2.2 MMOL/L
ANION GAP SERPL CALCULATED.3IONS-SCNC: 2.2 MMOL/L
AST SERPL-CCNC: 17 U/L
AST SERPL-CCNC: 17 U/L
BILIRUB SERPL-MCNC: 0.5 MG/DL (ref 0.1–1.4)
BILIRUB SERPL-MCNC: 0.5 MG/DL (ref 0.1–1.4)
BUN BLDV-MCNC: 14 MG/DL
BUN BLDV-MCNC: 14 MG/DL
CALCIUM SERPL-MCNC: 8.9 MG/DL
CALCIUM SERPL-MCNC: 8.9 MG/DL
CHLORIDE BLD-SCNC: 105 MMOL/L
CHLORIDE BLD-SCNC: 105 MMOL/L
CHOLESTEROL, TOTAL: 165 MG/DL
CHOLESTEROL, TOTAL: 165 MG/DL
CHOLESTEROL/HDL RATIO: NORMAL
CHOLESTEROL/HDL RATIO: NORMAL
CO2: 24 MMOL/L
CO2: 24 MMOL/L
CREAT SERPL-MCNC: 1.09 MG/DL
CREAT SERPL-MCNC: 1.09 MG/DL
EGFR: 68
EGFR: 68
GLUCOSE BLD-MCNC: 102 MG/DL
GLUCOSE BLD-MCNC: 102 MG/DL
HDLC SERPL-MCNC: 50 MG/DL (ref 35–70)
HDLC SERPL-MCNC: 50 MG/DL (ref 35–70)
LDL CHOLESTEROL CALCULATED: 101 MG/DL (ref 0–160)
LDL CHOLESTEROL CALCULATED: 101 MG/DL (ref 0–160)
NONHDLC SERPL-MCNC: NORMAL MG/DL
NONHDLC SERPL-MCNC: NORMAL MG/DL
POTASSIUM SERPL-SCNC: 3.9 MMOL/L
POTASSIUM SERPL-SCNC: 3.9 MMOL/L
SODIUM BLD-SCNC: 141 MMOL/L
SODIUM BLD-SCNC: 141 MMOL/L
TOTAL PROTEIN: 6.3
TOTAL PROTEIN: 6.3
TRIGL SERPL-MCNC: 75 MG/DL
TRIGL SERPL-MCNC: 75 MG/DL
VITAMIN D 25-HYDROXY: 45.1
VITAMIN D 25-HYDROXY: 45.1
VITAMIN D2, 25 HYDROXY: NORMAL
VITAMIN D2, 25 HYDROXY: NORMAL
VITAMIN D3,25 HYDROXY: NORMAL
VITAMIN D3,25 HYDROXY: NORMAL
VLDLC SERPL CALC-MCNC: 14 MG/DL
VLDLC SERPL CALC-MCNC: NORMAL MG/DL

## 2023-10-27 PROBLEM — Z78.9 STATIN INTOLERANCE: Status: ACTIVE | Noted: 2023-10-27

## 2023-10-27 NOTE — PROGRESS NOTES
Labs reviewed. All OK except  not at goal. Per RKG he is statin intolerant but supposedly started zetia 10mg qd. I do not see on his list of meds? Is he taking zetia or not?

## 2023-10-27 NOTE — PROGRESS NOTES
direction. EKG 11/26/14  Sinus bradycardia with marked sinus arrhythmiaNo previous ECGs availableConfirmed by AMY NICHOLSON MD ([id number]) on 11/26/2014   CATH 11/26/14  Findings:     LM: normals  LAD: previous proximal stent patent with 30-40% instent restenosis at distal stent edge. LCX: DOMINANT, luminals. Om1 with mid 20%, distal LCX/PDA with 10-20%  RCA: small nonodominant    LVEDP 7  LVEF 65%  Plan:  1. Patent LAD stent  2. Nonobstructive CAD    Assessment:  Encounter Diagnoses   Name Primary? Statin intolerance     Atherosclerosis of native coronary artery of native heart without angina pectoris Yes    Other fatigue     Lightheaded        CAD (coronary artery disease): sp stent to the LAD in 2010. No angina  No angina continue asa  fish oil  Left heart cath 3.19.20 no significant stenosis  Hyperlipidemia: intolerant to statin therapy. Remains on fish oil does not want to take Zetia wants to do natural ways diet and supplements. Last lipids 10/23/23    Vit D 45.1  Statin discussed due to hx of stent. Statin intolerance  Dizziness no cause found will check carotid flow. 30 days monitor labs ok. Bedside neuroexam normal.  Fatigue possible related to slow heart rate  he is euthyroid as of 2022   Will get 30 day cardiac monitor      Plan:  Labs reviewed in epic and discussed with patient. Current medications reviewed. Refills given as warranted. Call [phone] to schedule a carotid doppler to check the blood flow in your neck  Call [phone] to schedule a heart monitor being placed since your heart rate is slow  This will be done at my [facility] [address], [address].     5.   I will personally review your tests and then I will have my staff call you with the results. 6.   You may need to see an ear doctor if all of your testing is ne    Follow up with me in 6 months    This note is scribed in the presence of Dr. Brendon Sanchez MD by Vinny Delgadillo, KELLEE. 6 months  I,

## 2023-10-30 ENCOUNTER — TELEPHONE (OUTPATIENT)
Dept: CARDIOLOGY CLINIC | Age: 83
End: 2023-10-30

## 2023-10-30 ENCOUNTER — OFFICE VISIT (OUTPATIENT)
Dept: CARDIOLOGY CLINIC | Age: 83
End: 2023-10-30
Payer: MEDICARE

## 2023-10-30 VITALS
HEART RATE: 48 BPM | SYSTOLIC BLOOD PRESSURE: 122 MMHG | HEIGHT: 69 IN | BODY MASS INDEX: 23.25 KG/M2 | OXYGEN SATURATION: 96 % | DIASTOLIC BLOOD PRESSURE: 60 MMHG | WEIGHT: 157 LBS

## 2023-10-30 DIAGNOSIS — I25.10 ATHEROSCLEROSIS OF NATIVE CORONARY ARTERY OF NATIVE HEART WITHOUT ANGINA PECTORIS: Primary | ICD-10-CM

## 2023-10-30 DIAGNOSIS — R42 LIGHTHEADED: ICD-10-CM

## 2023-10-30 DIAGNOSIS — Z78.9 STATIN INTOLERANCE: ICD-10-CM

## 2023-10-30 DIAGNOSIS — R53.83 OTHER FATIGUE: ICD-10-CM

## 2023-10-30 PROCEDURE — 99214 OFFICE O/P EST MOD 30 MIN: CPT | Performed by: INTERNAL MEDICINE

## 2023-10-30 PROCEDURE — G8420 CALC BMI NORM PARAMETERS: HCPCS | Performed by: INTERNAL MEDICINE

## 2023-10-30 PROCEDURE — 1036F TOBACCO NON-USER: CPT | Performed by: INTERNAL MEDICINE

## 2023-10-30 PROCEDURE — 93000 ELECTROCARDIOGRAM COMPLETE: CPT | Performed by: INTERNAL MEDICINE

## 2023-10-30 PROCEDURE — G8484 FLU IMMUNIZE NO ADMIN: HCPCS | Performed by: INTERNAL MEDICINE

## 2023-10-30 PROCEDURE — 1123F ACP DISCUSS/DSCN MKR DOCD: CPT | Performed by: INTERNAL MEDICINE

## 2023-10-30 PROCEDURE — G8427 DOCREV CUR MEDS BY ELIG CLIN: HCPCS | Performed by: INTERNAL MEDICINE

## 2023-10-30 NOTE — TELEPHONE ENCOUNTER
Pts wife Maryfrances Coddington called and stated pt has Carotid Test on 11/20/23 11:00AM PT wants to know if he can start his 30 day heart monitor before then or does he need to wait until after? Pts wife stated pt would like to come into [facility] to get heart monitor placed.  Pts wife ph#[phone]

## 2023-10-30 NOTE — PATIENT INSTRUCTIONS
Plan:  Labs reviewed in epic and discussed with patient. Current medications reviewed. Refills given as warranted. Call [phone] to schedule a carotid doppler to check the blood flow in your neck  Call [phone] to schedule a heart monitor being placed since your heart rate is slow  This will be done at my [facility] [address], [address].     5.   I will personally review your tests and then I will have my staff call you with the results.    6.   You may need to see an ear doctor if all of your testing is ne    Follow up with me in 6 months

## 2023-11-06 ENCOUNTER — TELEPHONE (OUTPATIENT)
Dept: CARDIOLOGY CLINIC | Age: 83
End: 2023-11-06

## 2023-11-06 NOTE — TELEPHONE ENCOUNTER
Monitor placed by [employer]  Length of monitor 28 days  Monitor ordered by BRAULIO  Patch ID 1: [id number]  Patch ID 2: [id number]  Patch ID 3: [id number]  Patch ID 4: [id number]  New device name: [employer]  Phone ID: [id number]    Activation successful prior to pt leaving office? Yes    Pt seen in office today for LAB/MA visit for 28 day monitor placement. Pt given instructions and pt v/u. Scanned monitor order, VC pt intake, and insurance cards into chart. Routed these to [employer].

## 2023-11-09 ENCOUNTER — HOSPITAL ENCOUNTER (OUTPATIENT)
Dept: VASCULAR LAB | Age: 83
Discharge: HOME | End: 2023-11-09
Payer: MEDICARE

## 2023-11-09 DIAGNOSIS — R42 LIGHTHEADED: ICD-10-CM

## 2023-11-09 PROCEDURE — 93880 EXTRACRANIAL BILAT STUDY: CPT

## 2023-11-10 ENCOUNTER — TELEPHONE (OUTPATIENT)
Dept: CARDIOLOGY CLINIC | Age: 83
End: 2023-11-10

## 2023-11-10 NOTE — TELEPHONE ENCOUNTER
Naima Polk MD  [facility] Staff  Caller: Unspecified (Today,  3:18 PM)  BP in office was good    He should check his BP twice a day AM and PM  send me the results after one week.

## 2023-11-10 NOTE — TELEPHONE ENCOUNTER
Called and went over results with pt wife, ok pr MOMO v/u. Pt wife states pt was working in the garage and states he started to not feel good and checked his BP and it was 180/90 HR 45  After lunch it was 161/74 HR 49 after his nap 128/72 50. Please advise.

## 2023-11-10 NOTE — TELEPHONE ENCOUNTER
----- Message from Jean Marie Beltran MD sent at 11/10/2023  3:14 PM EST -----  No significant blockage in neck arteries on ultrasound.  ----- Message -----  From: Aislinn Mccollum Incoming Cardiovascular Orders From [facility]  Sent: 11/10/2023  11:20 AM EST  To: Jean Marie Beltran MD

## 2023-11-20 ENCOUNTER — TELEPHONE (OUTPATIENT)
Dept: CARDIOLOGY CLINIC | Age: 83
End: 2023-11-20

## 2023-11-20 DIAGNOSIS — R00.1 BRADYCARDIA: Primary | ICD-10-CM

## 2023-11-20 NOTE — TELEPHONE ENCOUNTER
Patient called and states he was having trouble getting his heart monitor to connect. CLAUDIO logged into [employer] and could see there was no information recorder after 11/13/23. CLAUDIO walked the patient and his wife through connecting a new monitor, with the same result, saying there was no connection. CLAUDIO had the patient and wife call the 1-800 number provided as all steps were taken correctly and the issue was still going on. The patient stated he was tired of the monitor not working and was complaining of symptoms. The wife stated the patient has been limited with activity. The patient states he notices his heart rate drop into the upper 40s and he will get light headed The patient also states he has difficulties writing, and fatigues easily. He states he overall just feels poorly. CLAUDIO encouraged the patient to go to the ED but the patient stated he did not want to do that until absolutely neccessary. The patient will be emailing a list with updated blood pressures and Hrs that he has been taken over the past few weeks.

## 2023-11-20 NOTE — TELEPHONE ENCOUNTER
Is there another type of monitor he can wear? If not and unwilling then follow for now and make sure RKG gets this message to see if implantable loop recorder device per EP partner is needed.

## 2023-11-20 NOTE — TELEPHONE ENCOUNTER
I sent JP a message through [employer] for him to address on Wednesday when he comes into hospital for COOPER and CV. I am not sure what to do with long, complicated message in patient who I have never met. Will defer to JP.

## 2023-11-21 NOTE — TELEPHONE ENCOUNTER
Attempted to call and relay [facility] message below, no answer, lvm to call back. Will try again at later date.

## 2023-11-22 ENCOUNTER — HOSPITAL ENCOUNTER (OUTPATIENT)
Dept: NON INVASIVE DIAGNOSTICS | Age: 83
Discharge: HOME | End: 2023-11-22

## 2023-11-28 ENCOUNTER — HOSPITAL ENCOUNTER (OUTPATIENT)
Dept: NON INVASIVE DIAGNOSTICS | Age: 83
Discharge: HOME | End: 2023-11-28
Attending: INTERNAL MEDICINE
Payer: MEDICARE

## 2023-11-28 DIAGNOSIS — R00.1 BRADYCARDIA: ICD-10-CM

## 2023-11-28 PROCEDURE — 93017 CV STRESS TEST TRACING ONLY: CPT

## 2023-11-28 PROCEDURE — 93225 XTRNL ECG REC<48 HRS REC: CPT

## 2023-11-28 NOTE — PROGRESS NOTES
[address] WITHOUT COMPLICATIONS. PT. VERBALLY UNDERSTANDS INSTRUCTIONS GIVEN.   UNIT: B  TIME: 08:14

## 2023-12-02 LAB
ACQUISITION DURATION: NORMAL S
AVERAGE HEART RATE: 54 BPM
EKG DIAGNOSIS: NORMAL
HOLTER MAX HEART RATE: 81 BPM
HOOKUP DATE: NORMAL
HOOKUP TIME: NORMAL
MAX HEART RATE TIME/DATE: NORMAL
MIN HEART RATE TIME/DATE: NORMAL
MIN HEART RATE: 44 BPM
NUMBER OF QRS COMPLEXES: NORMAL
NUMBER OF SUPRAVENTRICULAR COUPLETS: 0
NUMBER OF SUPRAVENTRICULAR ECTOPICS: 24
NUMBER OF SUPRAVENTRICULAR ISOLATED BEATS: 18
NUMBER OF VENTRICULAR BIGEMINAL CYCLES: 0
NUMBER OF VENTRICULAR COUPLETS: 2
NUMBER OF VENTRICULAR ECTOPICS: 8

## 2023-12-05 ENCOUNTER — TELEPHONE (OUTPATIENT)
Dept: CARDIOLOGY CLINIC | Age: 83
End: 2023-12-05

## 2023-12-05 NOTE — TELEPHONE ENCOUNTER
Spoke with Wife and PT. Appt schedule for 1.11.24 with Dr. Sonia Michaels @ [address] [place] [address] location. Date/time/location given to wife/pt both V/U. Pt is wanting to know if their is anything that Dr. Newton Folds thinks he should or should not be doing?

## 2023-12-05 NOTE — TELEPHONE ENCOUNTER
Pt wife called and states you told her to give us a call to schedule with a specific dr on Wednesday at 2? Please advise who you want him scheduled with and call and explain to pt once done.

## 2023-12-06 NOTE — TELEPHONE ENCOUNTER
Spoke to pt's Wife, Nicol porter per HIPAA. Relayed [facility] message. Joanna sevilla/shanda.

## 2023-12-06 NOTE — TELEPHONE ENCOUNTER
Daniele Crowe MD  [facility] [id number] minutes ago (11:47 AM)       Nothing in terms of medication but if he gets feeling of passing out or passes out go to Emergency room.

## 2023-12-08 DIAGNOSIS — R42 LIGHTHEADEDNESS: ICD-10-CM

## 2023-12-08 DIAGNOSIS — R00.1 BRADYCARDIA: Primary | ICD-10-CM

## 2023-12-08 DIAGNOSIS — R42 LIGHTHEADED: ICD-10-CM

## 2024-01-08 NOTE — PROGRESS NOTES
[facility]   Electrophysiology Consultation     Date: 1/11/2024  Reason for Consultation: Symptomatic bradycardia   Consult Requesting Physician: Dr. Mary     CC: Tired, dizzy    HPI: Crescencio Staton Jr. is a 83 y.o. male history of CAD status post LAD PCI in 2010, left heart catheterization in 3/2020 with patent stent, nonobstructive CAD, hyperlipidemia, followed by cardiology, complaints of fatigue and lightheadedness throughout the day, cardiac monitor 11/6 through 11/21 demonstrated sinus rhythm, average heart rate of 54 with lowest heart rates in the 30s () that occurred overnight, short runs of SVT, 1.75% PAC burden.  48-hour monitor 11/28 with average heart rate 54 (44-81).    Patient presents today as a new patient for evaluation management of suspected symptomatic bradycardia.  States he has always had a low resting heart rate however over the past 1 to 2 months he has noticed daytime fatigue, dizziness, checks his heart rate at home during symptoms which has been in the low 40s (last time was 41 bpm).  He is also felt more short of breath with exertional intolerance.  Only recent medication changes include Ambien dosage for insomnia, with recent stress test was able to get heart rates to the 130s. His thyroid was checked but he is not sure of the results. He typically follows with Dr Mary but saw Dr Machuca recently as he wanted a second opinion for symptoms and is a personal acquaintance of him.    Review of System:  [x] Full ROS obtained and negative except as mentioned in HPI or below      Prior to Admission medications    Medication Sig Start Date End Date Taking? Authorizing Provider   pantoprazole (PROTONIX) 40 MG tablet  10/13/22  Yes Provider, MD Carolyn   zinc sulfate 1 MG/ML injection Place 1 mL into the trachea daily   Yes Provider, MD Carolyn   polycarbophil (FIBERCON) 625 MG tablet Take 1 tablet by mouth daily   Yes Provider, MD Carolyn   Cholecalciferol

## 2024-01-11 ENCOUNTER — TELEPHONE (OUTPATIENT)
Dept: CARDIOLOGY CLINIC | Age: 84
End: 2024-01-11

## 2024-01-11 ENCOUNTER — OFFICE VISIT (OUTPATIENT)
Dept: CARDIOLOGY CLINIC | Age: 84
End: 2024-01-11
Payer: MEDICARE

## 2024-01-11 VITALS
HEART RATE: 47 BPM | BODY MASS INDEX: 23.46 KG/M2 | HEIGHT: 69 IN | SYSTOLIC BLOOD PRESSURE: 140 MMHG | DIASTOLIC BLOOD PRESSURE: 68 MMHG | WEIGHT: 158.4 LBS | OXYGEN SATURATION: 98 %

## 2024-01-11 DIAGNOSIS — R00.1 BRADYCARDIA: Primary | ICD-10-CM

## 2024-01-11 PROCEDURE — 99204 OFFICE O/P NEW MOD 45 MIN: CPT | Performed by: INTERNAL MEDICINE

## 2024-01-11 PROCEDURE — G8420 CALC BMI NORM PARAMETERS: HCPCS | Performed by: INTERNAL MEDICINE

## 2024-01-11 PROCEDURE — G8484 FLU IMMUNIZE NO ADMIN: HCPCS | Performed by: INTERNAL MEDICINE

## 2024-01-11 PROCEDURE — G8427 DOCREV CUR MEDS BY ELIG CLIN: HCPCS | Performed by: INTERNAL MEDICINE

## 2024-01-11 PROCEDURE — 1123F ACP DISCUSS/DSCN MKR DOCD: CPT | Performed by: INTERNAL MEDICINE

## 2024-01-11 PROCEDURE — 1036F TOBACCO NON-USER: CPT | Performed by: INTERNAL MEDICINE

## 2024-01-11 NOTE — PATIENT INSTRUCTIONS
Discussed results of cardiac event monitor   Discussed risks and benefits of pacemaker placement  -consider your options and let us know how you would like to proceed  Follow up pending decision

## 2024-01-11 NOTE — TELEPHONE ENCOUNTER
Please obtain recent stress test and labwork completed through [facility]. Patient seen today 1/11/2024 by CMV. Thanks.

## 2024-01-25 NOTE — TELEPHONE ENCOUNTER
Pt returned call. They have not decided on a pacemaker at this time due to no internet connection at there home. He said that Dr. Machuca was discussing doing another monitor to see what was going on with the low hr. He said that his angiogram came back well with no blockages.

## 2024-07-10 ENCOUNTER — HOSPITAL ENCOUNTER (EMERGENCY)
Age: 84
Discharge: HOME | End: 2024-07-10
Payer: MEDICARE

## 2024-07-10 ENCOUNTER — APPOINTMENT (OUTPATIENT)
Dept: CT IMAGING | Age: 84
End: 2024-07-10
Payer: MEDICARE

## 2024-07-10 VITALS
SYSTOLIC BLOOD PRESSURE: 139 MMHG | BODY MASS INDEX: 22.22 KG/M2 | TEMPERATURE: 97.8 F | HEIGHT: 69 IN | OXYGEN SATURATION: 99 % | WEIGHT: 150 LBS | DIASTOLIC BLOOD PRESSURE: 63 MMHG | HEART RATE: 50 BPM | RESPIRATION RATE: 18 BRPM

## 2024-07-10 DIAGNOSIS — K59.00 CONSTIPATION, UNSPECIFIED CONSTIPATION TYPE: Primary | ICD-10-CM

## 2024-07-10 LAB
ALBUMIN SERPL-MCNC: 4.4 G/DL (ref 3.4–5)
ALBUMIN/GLOB SERPL: 1.8 {RATIO} (ref 1.1–2.2)
ALP SERPL-CCNC: 60 U/L (ref 40–129)
ALT SERPL-CCNC: 7 U/L (ref 10–40)
ANION GAP SERPL CALCULATED.3IONS-SCNC: 10 MMOL/L (ref 3–16)
AST SERPL-CCNC: 23 U/L (ref 15–37)
BASOPHILS # BLD: 0 K/UL (ref 0–0.2)
BASOPHILS NFR BLD: 0.8 %
BILIRUB SERPL-MCNC: 0.6 MG/DL (ref 0–1)
BUN SERPL-MCNC: 15 MG/DL (ref 7–20)
CALCIUM SERPL-MCNC: 9.1 MG/DL (ref 8.3–10.6)
CHLORIDE SERPL-SCNC: 103 MMOL/L (ref 99–110)
CO2 SERPL-SCNC: 26 MMOL/L (ref 21–32)
CREAT SERPL-MCNC: 1 MG/DL (ref 0.8–1.3)
DEPRECATED RDW RBC AUTO: 13.3 % (ref 12.4–15.4)
EOSINOPHIL # BLD: 0 K/UL (ref 0–0.6)
EOSINOPHIL NFR BLD: 0.7 %
GFR SERPLBLD CREATININE-BSD FMLA CKD-EPI: 75 ML/MIN/{1.73_M2}
GLUCOSE SERPL-MCNC: 96 MG/DL (ref 70–99)
HCT VFR BLD AUTO: 41.3 % (ref 40.5–52.5)
HEMOCCULT STL QL: NORMAL
HGB BLD-MCNC: 14.4 G/DL (ref 13.5–17.5)
INR PPP: 1 (ref 0.85–1.15)
LYMPHOCYTES # BLD: 1.2 K/UL (ref 1–5.1)
LYMPHOCYTES NFR BLD: 19.8 %
MCH RBC QN AUTO: 32.1 PG (ref 26–34)
MCHC RBC AUTO-ENTMCNC: 34.9 G/DL (ref 31–36)
MCV RBC AUTO: 92.2 FL (ref 80–100)
MONOCYTES # BLD: 0.5 K/UL (ref 0–1.3)
MONOCYTES NFR BLD: 8.6 %
NEUTROPHILS # BLD: 4.1 K/UL (ref 1.7–7.7)
NEUTROPHILS NFR BLD: 70.1 %
PLATELET # BLD AUTO: 201 K/UL (ref 135–450)
PMV BLD AUTO: 8 FL (ref 5–10.5)
POTASSIUM SERPL-SCNC: 4 MMOL/L (ref 3.5–5.1)
PROT SERPL-MCNC: 6.8 G/DL (ref 6.4–8.2)
PROTHROMBIN TIME: 13.4 SEC (ref 11.9–14.9)
RBC # BLD AUTO: 4.48 M/UL (ref 4.2–5.9)
SODIUM SERPL-SCNC: 139 MMOL/L (ref 136–145)
WBC # BLD AUTO: 5.9 K/UL (ref 4–11)

## 2024-07-10 PROCEDURE — 82270 OCCULT BLOOD FECES: CPT

## 2024-07-10 PROCEDURE — 80053 COMPREHEN METABOLIC PANEL: CPT

## 2024-07-10 PROCEDURE — 85025 COMPLETE CBC W/AUTO DIFF WBC: CPT

## 2024-07-10 PROCEDURE — 6360000004 HC RX CONTRAST MEDICATION: Performed by: NURSE PRACTITIONER

## 2024-07-10 PROCEDURE — 74177 CT ABD & PELVIS W/CONTRAST: CPT

## 2024-07-10 PROCEDURE — 99285 EMERGENCY DEPT VISIT HI MDM: CPT

## 2024-07-10 PROCEDURE — 85610 PROTHROMBIN TIME: CPT

## 2024-07-10 RX ORDER — POLYETHYLENE GLYCOL 3350 17 G/17G
17 POWDER, FOR SOLUTION ORAL DAILY PRN
Qty: 510 G | Refills: 0 | Status: SHIPPED | OUTPATIENT
Start: 2024-07-10 | End: 2024-08-09

## 2024-07-10 RX ADMIN — IOPAMIDOL 75 ML: 755 INJECTION, SOLUTION INTRAVENOUS at 15:48

## 2024-07-10 ASSESSMENT — PAIN SCALES - GENERAL
PAINLEVEL_OUTOF10: 0
PAINLEVEL_OUTOF10: 0

## 2024-07-10 ASSESSMENT — PAIN - FUNCTIONAL ASSESSMENT
PAIN_FUNCTIONAL_ASSESSMENT: NONE - DENIES PAIN
PAIN_FUNCTIONAL_ASSESSMENT: NONE - DENIES PAIN

## 2024-07-10 ASSESSMENT — LIFESTYLE VARIABLES
HOW OFTEN DO YOU HAVE A DRINK CONTAINING ALCOHOL: 4 OR MORE TIMES A WEEK
HOW MANY STANDARD DRINKS CONTAINING ALCOHOL DO YOU HAVE ON A TYPICAL DAY: 1 OR 2

## 2024-07-10 NOTE — ED PROVIDER NOTES
[facility] ED  EMERGENCY DEPARTMENT ENCOUNTER        Pt Name: Crescencio Staton Jr.  MRN: [id number]  Birthdate 1940  Date of evaluation: 7/10/2024  Provider: VANNESA Cheema - CNP  PCP: Raj Valencia MD  Note Started: 3:23 PM EDT 7/10/24      JANNETTE. I have evaluated this patient.        CHIEF COMPLAINT       Chief Complaint   Patient presents with    Melena     Pt arrives from home with abdominal discomfort x1 month. Pt states he's been experiencing discomfort and leakage from his rectum for a month but recently developed black tarry stools a week ago. Pt reports mother having colon cancer in her 80s        HISTORY OF PRESENT ILLNESS: 1 or more Elements     History From: Patient     Limitations to history : None    Social Determinants Significantly Affecting Health : None    Chief Complaint: Black appearing stool     Crescencio Staton Jr. is a 84 y.o. male who presents to the emergency department today with some dark appearing stools.  Was concerned that the patient was having some bleeding in his colon or bowels.  States that he also had upset stomach for the last few days.  Denies any particular pain but does report uncomfortable in his abdomen.  No neck pain or back pain.  No fevers no chills.  No vomiting.  Otherwise states feeling well.    Nursing Notes were all reviewed and agreed with or any disagreements were addressed in the HPI.    REVIEW OF SYSTEMS :      Review of Systems    Positives and Pertinent negatives as per HPI.     SURGICAL HISTORY     Past Surgical History:   Procedure Laterality Date    CORONARY ANGIOPLASTY WITH STENT PLACEMENT  02/23/2010    XIENCE CHEIKH 3.0 x 23 pLAD    CYSTOSCOPY      stone removal    CYSTOSCOPY Right 2/11/2019    CYSTOSCOPY, URETEROSCOPY [place] [facility] [place]es [facility]ws [place] [facility]    CYSTOSCOPY  03/18/2019    CYSTOSCOPY, UROLIFT WITH FULGERATION OF BLEEDING POINTS wAshley Mathews 3/18/19, [employer], Urolift System, Lot: [id number]    OTHER

## 2024-11-10 ENCOUNTER — HOSPITAL ENCOUNTER (EMERGENCY)
Age: 84
Discharge: HOME | End: 2024-11-10
Attending: EMERGENCY MEDICINE
Payer: MEDICARE

## 2024-11-10 VITALS
HEART RATE: 58 BPM | RESPIRATION RATE: 16 BRPM | DIASTOLIC BLOOD PRESSURE: 69 MMHG | TEMPERATURE: 98.6 F | BODY MASS INDEX: 22.64 KG/M2 | HEIGHT: 69 IN | OXYGEN SATURATION: 98 % | SYSTOLIC BLOOD PRESSURE: 142 MMHG | WEIGHT: 152.9 LBS

## 2024-11-10 DIAGNOSIS — U07.1 COVID-19: Primary | ICD-10-CM

## 2024-11-10 LAB
FLUAV RNA UPPER RESP QL NAA+PROBE: NEGATIVE
FLUBV AG NPH QL: NEGATIVE
SARS-COV-2 RDRP RESP QL NAA+PROBE: DETECTED

## 2024-11-10 PROCEDURE — 87804 INFLUENZA ASSAY W/OPTIC: CPT

## 2024-11-10 PROCEDURE — 87635 SARS-COV-2 COVID-19 AMP PRB: CPT

## 2024-11-10 PROCEDURE — 99283 EMERGENCY DEPT VISIT LOW MDM: CPT

## 2024-11-10 ASSESSMENT — PAIN - FUNCTIONAL ASSESSMENT
PAIN_FUNCTIONAL_ASSESSMENT: NONE - DENIES PAIN
PAIN_FUNCTIONAL_ASSESSMENT: NONE - DENIES PAIN

## 2024-11-10 NOTE — DISCHARGE INSTRUCTIONS
Take ibuprofen 600 mg every 6-8 hours with food  Continue your present medications  Get plenty of supportive care i.e. lots of rest, lots of fluids  Quarantine for the next 5 days

## 2024-11-10 NOTE — ED PROVIDER NOTES
[hydromorphone hcl], Lovastatin, Pravastatin, Simvastatin, and Tylenol [acetaminophen]    FAMILY HISTORY       Family History   Problem Relation Age of Onset    Heart Disease Mother     Colon Cancer Mother     Prostate Cancer Father 80          SOCIAL HISTORY       Social History     Socioeconomic History    Marital status:      Spouse name: None    Number of children: None    Years of education: None    Highest education level: None   Tobacco Use    Smoking status: Former     Current packs/day: 0.00     Average packs/day: 1 pack/day for 10.0 years (10.0 ttl pk-yrs)     Types: Cigarettes     Start date: 1954     Quit date: 1963     Years since quittin.9    Smokeless tobacco: Never    Tobacco comments:     I haven't used tobacco in any form for over 50 years   Vaping Use    Vaping status: Never Used   Substance and Sexual Activity    Alcohol use: Yes     Alcohol/week: 2.0 standard drinks of alcohol     Types: 1 Glasses of wine, 1 Cans of beer per week     Comment: I average 6 beers or glasses of wine per week.    Drug use: No    Sexual activity: Yes     Partners: Female       SCREENINGS    Mayur Coma Scale  Eye Opening: Spontaneous  Best Verbal Response: Oriented  Best Motor Response: Obeys commands  [place] Coma Scale Score: 15          PHYSICAL EXAM    (up to 7 for level 4, 8 or more for level 5)     ED Triage Vitals [11/10/24 1117]   BP Systolic BP Percentile Diastolic BP Percentile Temp Temp Source Pulse Respirations SpO2   (!) 142/69 -- -- 98.6 °F (37 °C) Oral 58 16 98 %      Height Weight - Scale         1.753 m (5' 9\") 69.4 kg (152 lb 14.4 oz)             Physical Exam  Vitals and nursing note reviewed.   Constitutional:       General: He is not in acute distress.     Appearance: Normal appearance. He is well-developed. He is ill-appearing. He is not diaphoretic.   HENT:      Head: Normocephalic and atraumatic.      Right Ear: Ear canal and external ear normal.      Left Ear: Ear canal

## 2024-11-12 ENCOUNTER — HOSPITAL ENCOUNTER (EMERGENCY)
Age: 84
Discharge: HOME | End: 2024-11-12
Attending: EMERGENCY MEDICINE
Payer: MEDICARE

## 2024-11-12 VITALS
WEIGHT: 152 LBS | SYSTOLIC BLOOD PRESSURE: 130 MMHG | TEMPERATURE: 98.4 F | HEIGHT: 69 IN | BODY MASS INDEX: 22.51 KG/M2 | RESPIRATION RATE: 16 BRPM | OXYGEN SATURATION: 98 % | DIASTOLIC BLOOD PRESSURE: 72 MMHG | HEART RATE: 62 BPM

## 2024-11-12 DIAGNOSIS — U07.1 COVID-19: Primary | ICD-10-CM

## 2024-11-12 LAB — S PYO AG THROAT QL: NEGATIVE

## 2024-11-12 PROCEDURE — 99283 EMERGENCY DEPT VISIT LOW MDM: CPT

## 2024-11-12 PROCEDURE — 6370000000 HC RX 637 (ALT 250 FOR IP): Performed by: EMERGENCY MEDICINE

## 2024-11-12 PROCEDURE — 87880 STREP A ASSAY W/OPTIC: CPT

## 2024-11-12 RX ORDER — LIDOCAINE HYDROCHLORIDE 20 MG/ML
5 SOLUTION OROPHARYNGEAL ONCE
Status: COMPLETED | OUTPATIENT
Start: 2024-11-12 | End: 2024-11-12

## 2024-11-12 RX ORDER — IBUPROFEN 400 MG/1
800 TABLET, FILM COATED ORAL ONCE
Status: COMPLETED | OUTPATIENT
Start: 2024-11-12 | End: 2024-11-12

## 2024-11-12 RX ORDER — ONDANSETRON 4 MG/1
8 TABLET, ORALLY DISINTEGRATING ORAL ONCE
Status: COMPLETED | OUTPATIENT
Start: 2024-11-12 | End: 2024-11-12

## 2024-11-12 RX ADMIN — LIDOCAINE HYDROCHLORIDE 5 ML: 20 SOLUTION ORAL at 12:39

## 2024-11-12 RX ADMIN — IBUPROFEN 800 MG: 400 TABLET, FILM COATED ORAL at 12:10

## 2024-11-12 RX ADMIN — ONDANSETRON 8 MG: 4 TABLET, ORALLY DISINTEGRATING ORAL at 12:10

## 2024-11-12 ASSESSMENT — PAIN DESCRIPTION - DESCRIPTORS: DESCRIPTORS: SHARP

## 2024-11-12 ASSESSMENT — LIFESTYLE VARIABLES
HOW MANY STANDARD DRINKS CONTAINING ALCOHOL DO YOU HAVE ON A TYPICAL DAY: PATIENT DOES NOT DRINK
HOW OFTEN DO YOU HAVE A DRINK CONTAINING ALCOHOL: NEVER

## 2024-11-12 ASSESSMENT — PAIN - FUNCTIONAL ASSESSMENT: PAIN_FUNCTIONAL_ASSESSMENT: 0-10

## 2024-11-12 ASSESSMENT — PAIN DESCRIPTION - LOCATION: LOCATION: THROAT

## 2024-11-12 ASSESSMENT — PAIN DESCRIPTION - PAIN TYPE: TYPE: ACUTE PAIN

## 2024-11-12 ASSESSMENT — ENCOUNTER SYMPTOMS
SHORTNESS OF BREATH: 0
COUGH: 1

## 2024-11-12 NOTE — DISCHARGE INSTRUCTIONS
take ibuprofen 600 to 800 mg 3 times a day with food  Drink lots of clear liquids  Try to take acetaminophen or Tylenol every 4 hours  Get plenty of rest

## 2024-11-12 NOTE — ED PROVIDER NOTES
Procedures    MEDICATIONS GIVEN THIS VISIT:  Medications   ibuprofen (ADVIL;MOTRIN) tablet 800 mg (800 mg Oral Given 11/12/24 1210)   ondansetron (ZOFRAN-ODT) disintegrating tablet 8 mg (8 mg Oral Given 11/12/24 1210)   lidocaine viscous hcl (XYLOCAINE) 2 % solution 5 mL (5 mLs Mouth/Throat Given 11/12/24 1239)        FINAL IMPRESSION      1. COVID-19            DISPOSITION/PLAN   DISPOSITION Decision To Discharge 11/12/2024 12:41:44 PM      PATIENT REFERRED TO:  No follow-up provider specified.    DISCHARGE MEDICATIONS:  New Prescriptions    No medications on file       Controlled Substances Monitoring  RX Monitoring Attestation Periodic Controlled Substance Monitoring   9/12/2018   5:29 PM The Prescription Monitoring Report for this patient was reviewed today. No signs of potential drug abuse or diversion identified.           (Please note that portions of this note were completed with a voice recognition program.  Efforts were made to edit the dictations but occasionally words are mis-transcribed.)    Patient was advised to return to the Emergency Department if there was any worsening.    ADARSH KUMARI MD (electronically signed)  Attending Emergency Physician           Adarsh Kumari MD  11/12/24 [id number]

## (undated) DEVICE — MEDI-VAC NON-CONDUCTIVE SUCTION TUBING: Brand: CARDINAL HEALTH

## (undated) DEVICE — INVIEW CLEAR LEGGINGS: Brand: CONVERTORS

## (undated) DEVICE — CIRCUIT ANES L72IN 3L BACT AND VIR FLTR EL CONN SGL LIMB

## (undated) DEVICE — Z DUP USE 2139333 GUIDEWIRE UROLOGICAL STR STD 0.035 IN X150 CM REG ZIPWIRE LF

## (undated) DEVICE — BAG URIN STRL FOR URO CTCH SYS

## (undated) DEVICE — SOLUTION IV IRRIG WATER 1000ML POUR BRL 2F7114

## (undated) DEVICE — GAUZE,SPONGE,4"X4",8PLY,STRL,LF,10/TRAY: Brand: MEDLINE

## (undated) DEVICE — GOWN SIRUS NONREIN XL W/TWL: Brand: MEDLINE INDUSTRIES, INC.

## (undated) DEVICE — DBD-PACK,CYSTOSCOPY,PK VI,AURORA: Brand: MEDLINE

## (undated) DEVICE — GLOVE ORANGE PI 7 1/2   MSG9075

## (undated) DEVICE — ELECTRODE PT RET AD L9FT HI MOIST COND ADH HYDRGEL CORDED

## (undated) DEVICE — Z CONVERTED USE 2271043 CONTAINER SPEC COLL 4OZ SCR ON LID PEEL PCH

## (undated) DEVICE — GOWN SIRUS NONREIN LG W/TWL: Brand: MEDLINE INDUSTRIES, INC.

## (undated) DEVICE — SOLUTION IV IRRIG 500ML 0.9% SODIUM CHL 2F7123

## (undated) DEVICE — Y-TYPE TUR/BLADDER IRRIGATION SET, REGULATING CLAMP

## (undated) DEVICE — CATHETER URETH 18FR 5CC BLLN SIL ELASTMR F 2 W

## (undated) DEVICE — Z INACTIVE USE 2635504 SOLUTION IRRIG 3000ML 1.5% GL USP UROMATIC CONT

## (undated) DEVICE — SYRINGE MED 10ML LUERLOCK TIP W/O SFTY DISP

## (undated) DEVICE — BOWL MED L 32OZ PLAS W/ MOLD GRAD EZ OPN PEEL PCH

## (undated) DEVICE — COVER BOOT THK2MIL UNIV POLYETH IMPERMEABLE FLD RESIST DISP

## (undated) DEVICE — Z DUP USE 2522782 SOLUTION IRRIG 1000ML STRL H2O PLAS CONTAINER UROMATIC

## (undated) DEVICE — CYSTO/BLADDER IRRIGATION SET, REGULATING CLAMP

## (undated) DEVICE — PREP SOL PVP IODINE 4%  4 OZ/BTL